# Patient Record
Sex: FEMALE | Race: WHITE | Employment: FULL TIME | URBAN - METROPOLITAN AREA
[De-identification: names, ages, dates, MRNs, and addresses within clinical notes are randomized per-mention and may not be internally consistent; named-entity substitution may affect disease eponyms.]

---

## 2018-11-23 ENCOUNTER — TELEPHONE (OUTPATIENT)
Dept: TRANSPLANT | Facility: CLINIC | Age: 63
End: 2018-11-23

## 2018-11-23 NOTE — TELEPHONE ENCOUNTER
"[facility] BREEZE  [id number]      LIVING KIDNEY DONOR EVALUATION  Donor First Name Jacque Donor MRN    Donor Last Name Hamilton Completed 2018 12:47 PM    1955 Record ID [id number]   BREEZE Screen PASSED     Intended Recipient  Recipient First Name Chavez Recipient MRN    Recipient Last Name Aamir Relationship Co-worker   Recipient   Recipient Diagnosis    Recipient's ABO      Donor Information  Age 63 Gender Female   Ht 165 cm (5' 5'') Race    Wt 65.7 kg (145 lbs) Ethnicity Not /   BMI 24.10 kg/m  Preferred Language English      Required No     Blood Type A   Demographics  Home Address [address] [place] [address] # [phone]   [facility] Type [facility] Alternate # [phone]   Zip Code [address] Type Work - Private   Country [place] Preferred Contact day Mon, Fri, Thur, Wed, Tue   Email [email or web address] Preferred Contact time 11:00 AM-1:00 PM, 09:00 AM-11:00 AM   &&   Donor's Medical Information  Medical History Congenital Heart Defect NOS   Fibromyalgia   Glaucoma   Insomnia   Joint Pain   Joint Pain/Arthritis NOS   Migraine Headaches   Osteoporosis   Stress Tests, Normal  Medications Alendronate   Ambien   Amitriptyline   Calcium Citrate with Vitamin D3   Combigan Eye Drops   Imitrex Tablets   Latanoprost Eye Drops   Methocarbamol   rhopressa   Surgical History Bunionectomy, Right   Hysterectomy   Total Knee Arthroplasty, Left   Total Knee Arthroplasty, Right   Tubal Ligation   Uterine and/or Ovarian Surgery, NOS Allergies Morphine : Nausea and/or Vomiting   Social History EtOH: Rare (1-2 drinks/month)   Illicit Drug Use: Denies   Tobacco: Denies Self-Reported Functional Status \"I am able to climb 2 flights of stairs without stopping\"   Family Medical History Cancer (denies)   Diabetes (denies)   Heart Disease (denies)   Hypertension (denies)   Kidney Disease (denies)   Kidney Stones (denies) Exercise Frequency Exercise (2 " X per week)   Review of Organ Systems  Review of Systems Airway or Lungs: No   Blood Disorder: No   Cancer: No   Diabetes,Thyroid,Adrenal,Endocrine Disorder: No   Digestive or Liver: No   Female Health: No   Heart or Circulatory System: Yes   Immune Diseases: No   Kidneys and Bladder: No   Muscles,Bones,Joints: Yes   Neuro: No   Psych: No   &&   Donor's Social Information  Marital Status  Living Accommodation Owns own home/apartment   Level of Education High school or secondary school degree complete Living Arrangement With relatives other than spouse, parents   Employment Status Full Time Concerns: health and life insurance Yes   Employer [employer] Concerns: job security and lost income No   Occupation      Medical Insurance Status Has medical insurance     High Risk Behavior  High Risk Behaviors Blood transfusion < 12 months. (NO)   Commercial sex < 12 months. (NO)   Illicit IV drug use < 5yrs. (NO)   Other high risk sexual contact < 12 months. (NO)   Reason for Donation  Referral Self Researched Reason for Donation I have a close friend and colleague that has been suffering with Diabetes and is now in dialysis. Want to help   Permission to Disclose Inquiry Yes Patient Comments    Donor Motivation Level Highly motivated donor     PCP Contact  [facility]   PCP Phone [phone]   Emergency Contact  First Name Traci First Name Wes   Last Name Hamilton Last Name Hamilton   Phone # [phone] Phone # [phone]   Phone Type Mobile Phone Type Mobile   Relationship Child Relationship Child   Office Use  Reviewed By    Reviewed 11/21/2018 9:23 AM   Admin Folder Accept   Comments 11/21 - Passed Eval   Lost for Followup    Extended Comments    BREEZE ID [email or web address]:[id number] survey status completed   Activity History  Call  Task    Due Date 11/21/2018   Last Modified Date/Time 11/21/2018 1:49 PM   Comments

## 2018-11-26 DIAGNOSIS — Z00.5 TRANSPLANT DONOR EVALUATION: Primary | ICD-10-CM

## 2018-11-26 NOTE — TELEPHONE ENCOUNTER
Is abo incompat. Interested in PEP. Hx Fibromyalgia--states she occ has mild flare ups--about 1-2x's a yr.Discussed her use of Methocarbamol which she states she uses occ. Will send Phase 1 orders/pkt.

## 2018-12-14 ENCOUNTER — DOCUMENTATION ONLY (OUTPATIENT)
Dept: TRANSPLANT | Facility: CLINIC | Age: 63
End: 2018-12-14

## 2019-01-03 DIAGNOSIS — Z00.5 TRANSPLANT DONOR EVALUATION: Primary | ICD-10-CM

## 2019-01-07 ENCOUNTER — DOCUMENTATION ONLY (OUTPATIENT)
Dept: TRANSPLANT | Facility: CLINIC | Age: 64
End: 2019-01-07

## 2019-01-15 ENCOUNTER — TELEPHONE (OUTPATIENT)
Dept: TRANSPLANT | Facility: CLINIC | Age: 64
End: 2019-01-15

## 2019-01-15 DIAGNOSIS — Z00.5 TRANSPLANT DONOR EVALUATION: Primary | ICD-10-CM

## 2019-01-15 NOTE — TELEPHONE ENCOUNTER
Informed Vanessa that her CrCl was OK'd by Dr Fuentes. FBS was 103 on Phase 1's--will get that repeated with an A1C.

## 2019-01-21 ENCOUNTER — TELEPHONE (OUTPATIENT)
Dept: TRANSPLANT | Facility: CLINIC | Age: 64
End: 2019-01-21

## 2019-01-21 NOTE — TELEPHONE ENCOUNTER
Informed Melisa that her FBS/A1C are normal. Wants to now come for eval. [place] Has not left [place] in past 3 months.

## 2019-01-22 ENCOUNTER — TELEPHONE (OUTPATIENT)
Dept: TRANSPLANT | Facility: CLINIC | Age: 64
End: 2019-01-22

## 2019-01-23 DIAGNOSIS — Z00.5 TRANSPLANT DONOR EVALUATION: ICD-10-CM

## 2019-01-23 NOTE — TELEPHONE ENCOUNTER
Sent a email letting Jacque know about the eval and how it works. Asked her to email me some dates that would work for her

## 2019-01-25 ENCOUNTER — DOCUMENTATION ONLY (OUTPATIENT)
Dept: CARE COORDINATION | Facility: CLINIC | Age: 64
End: 2019-01-25

## 2019-02-01 ENCOUNTER — TELEPHONE (OUTPATIENT)
Dept: TRANSPLANT | Facility: CLINIC | Age: 64
End: 2019-02-01

## 2019-02-01 NOTE — TELEPHONE ENCOUNTER
I received a message from the patient that she wanted to ask me about the CT procedure as she tends to get a little claustraphobic.  I spoke to the CT Tech to get the following description:  The procedure would have her go into the imaging machine up to her shoulder/arm pit area.  During the 8 minutes test there is a break where they have to wait for a delay.  She said that the patients head would be outside of the machine and there is a painting on the ceiling or a washcloth could be applied to their eyes if they prefer for relaxation.  There is sedation that can be done, but she would need a .    I have left a voicemail message with the patient with this information and gave her my phone number to call me back if she should have further concerns.

## 2019-02-08 ENCOUNTER — INFUSION THERAPY VISIT (OUTPATIENT)
Dept: INFUSION THERAPY | Facility: CLINIC | Age: 64
End: 2019-02-08
Attending: INTERNAL MEDICINE

## 2019-02-08 ENCOUNTER — ALLIED HEALTH/NURSE VISIT (OUTPATIENT)
Dept: TRANSPLANT | Facility: CLINIC | Age: 64
End: 2019-02-08
Attending: TRANSPLANT SURGERY

## 2019-02-08 ENCOUNTER — OFFICE VISIT (OUTPATIENT)
Dept: NEPHROLOGY | Facility: CLINIC | Age: 64
End: 2019-02-08
Attending: TRANSPLANT SURGERY
Payer: COMMERCIAL

## 2019-02-08 ENCOUNTER — ANCILLARY PROCEDURE (OUTPATIENT)
Dept: GENERAL RADIOLOGY | Facility: CLINIC | Age: 64
End: 2019-02-08
Attending: INTERNAL MEDICINE
Payer: COMMERCIAL

## 2019-02-08 ENCOUNTER — OFFICE VISIT (OUTPATIENT)
Dept: TRANSPLANT | Facility: CLINIC | Age: 64
End: 2019-02-08
Attending: TRANSPLANT SURGERY

## 2019-02-08 ENCOUNTER — ANCILLARY PROCEDURE (OUTPATIENT)
Dept: CT IMAGING | Facility: CLINIC | Age: 64
End: 2019-02-08
Attending: INTERNAL MEDICINE

## 2019-02-08 VITALS
OXYGEN SATURATION: 98 % | HEART RATE: 88 BPM | SYSTOLIC BLOOD PRESSURE: 120 MMHG | WEIGHT: 146.4 LBS | BODY MASS INDEX: 24.39 KG/M2 | DIASTOLIC BLOOD PRESSURE: 79 MMHG | HEIGHT: 65 IN

## 2019-02-08 DIAGNOSIS — Z00.5 EXAMINATION OF POTENTIAL DONOR OF ORGAN AND TISSUE: Primary | ICD-10-CM

## 2019-02-08 DIAGNOSIS — G47.00 INSOMNIA, UNSPECIFIED TYPE: ICD-10-CM

## 2019-02-08 DIAGNOSIS — Z00.5 TRANSPLANT DONOR EVALUATION: ICD-10-CM

## 2019-02-08 DIAGNOSIS — M81.0 OSTEOPOROSIS, UNSPECIFIED OSTEOPOROSIS TYPE, UNSPECIFIED PATHOLOGICAL FRACTURE PRESENCE: ICD-10-CM

## 2019-02-08 DIAGNOSIS — Z00.5 TRANSPLANT DONOR EVALUATION: Primary | ICD-10-CM

## 2019-02-08 DIAGNOSIS — M79.7 FIBROMYALGIA: ICD-10-CM

## 2019-02-08 DIAGNOSIS — Z52.4 KIDNEY DONOR: ICD-10-CM

## 2019-02-08 LAB
ABO + RH BLD: NORMAL
ALBUMIN SERPL-MCNC: 3.8 G/DL (ref 3.4–5)
ALBUMIN UR-MCNC: NEGATIVE MG/DL
ALP SERPL-CCNC: 49 U/L (ref 40–150)
ALT SERPL W P-5'-P-CCNC: 20 U/L (ref 0–50)
ANION GAP SERPL CALCULATED.3IONS-SCNC: 6 MMOL/L (ref 3–14)
APPEARANCE UR: CLEAR
APTT PPP: 26 SEC (ref 22–37)
AST SERPL W P-5'-P-CCNC: 16 U/L (ref 0–45)
BILIRUB SERPL-MCNC: 0.5 MG/DL (ref 0.2–1.3)
BILIRUB UR QL STRIP: NEGATIVE
BLD GP AB SCN SERPL QL: NORMAL
BLOOD BANK CMNT PATIENT-IMP: NORMAL
BUN SERPL-MCNC: 16 MG/DL (ref 7–30)
CALCIUM SERPL-MCNC: 8.2 MG/DL (ref 8.5–10.1)
CHLORIDE SERPL-SCNC: 108 MMOL/L (ref 94–109)
CHOLEST SERPL-MCNC: 184 MG/DL
CMV IGG SERPL QL IA: <0.2 AI (ref 0–0.8)
CO2 SERPL-SCNC: 28 MMOL/L (ref 20–32)
COLOR UR AUTO: YELLOW
CREAT SERPL-MCNC: 0.86 MG/DL (ref 0.52–1.04)
CREAT UR-MCNC: 113 MG/DL
EBV VCA IGG SER QL IA: >8 AI (ref 0–0.8)
EBV VCA IGM SER QL IA: <0.2 AI (ref 0–0.8)
ERYTHROCYTE [DISTWIDTH] IN BLOOD BY AUTOMATED COUNT: 12.2 % (ref 10–15)
GFR SERPL CREATININE-BSD FRML MDRD: 71 ML/MIN/{1.73_M2}
GLUCOSE SERPL-MCNC: 94 MG/DL (ref 70–99)
GLUCOSE UR STRIP-MCNC: NEGATIVE MG/DL
HBA1C MFR BLD: 5.2 % (ref 0–5.6)
HBV CORE AB SERPL QL IA: NONREACTIVE
HBV SURFACE AB SERPL IA-ACNC: 0.42 M[IU]/ML
HBV SURFACE AG SERPL QL IA: NONREACTIVE
HCT VFR BLD AUTO: 42 % (ref 35–47)
HCV AB SERPL QL IA: NONREACTIVE
HDLC SERPL-MCNC: 57 MG/DL
HGB BLD-MCNC: 13.5 G/DL (ref 11.7–15.7)
HGB UR QL STRIP: ABNORMAL
HIV 1+2 AB+HIV1 P24 AG SERPL QL IA: NONREACTIVE
INR PPP: 1.03 (ref 0.86–1.14)
KETONES UR STRIP-MCNC: NEGATIVE MG/DL
LDLC SERPL CALC-MCNC: 114 MG/DL
LEUKOCYTE ESTERASE UR QL STRIP: ABNORMAL
MCH RBC QN AUTO: 28.1 PG (ref 26.5–33)
MCHC RBC AUTO-ENTMCNC: 32.1 G/DL (ref 31.5–36.5)
MCV RBC AUTO: 87 FL (ref 78–100)
MICROALBUMIN UR-MCNC: 6 MG/L
MICROALBUMIN/CREAT UR: 5.64 MG/G CR (ref 0–25)
MUCOUS THREADS #/AREA URNS LPF: PRESENT /LPF
NITRATE UR QL: NEGATIVE
NONHDLC SERPL-MCNC: 127 MG/DL
PH UR STRIP: 6 PH (ref 5–7)
PHOSPHATE SERPL-MCNC: 2.8 MG/DL (ref 2.5–4.5)
PLATELET # BLD AUTO: 177 10E9/L (ref 150–450)
POTASSIUM SERPL-SCNC: 3.6 MMOL/L (ref 3.4–5.3)
PROT SERPL-MCNC: 6.8 G/DL (ref 6.8–8.8)
PROT UR-MCNC: 0.08 G/L
PROT/CREAT 24H UR: 0.07 G/G CR (ref 0–0.2)
RBC # BLD AUTO: 4.81 10E12/L (ref 3.8–5.2)
RBC #/AREA URNS AUTO: 2 /HPF (ref 0–2)
SODIUM SERPL-SCNC: 142 MMOL/L (ref 133–144)
SOURCE: ABNORMAL
SP GR UR STRIP: 1.02 (ref 1–1.03)
SPECIMEN EXP DATE BLD: NORMAL
SPECIMEN EXP DATE BLD: NORMAL
SQUAMOUS #/AREA URNS AUTO: 1 /HPF (ref 0–1)
T PALLIDUM AB SER QL: NONREACTIVE
TRIGL SERPL-MCNC: 65 MG/DL
URATE SERPL-MCNC: 3.7 MG/DL (ref 2.6–6)
UROBILINOGEN UR STRIP-MCNC: 0 MG/DL (ref 0–2)
WBC # BLD AUTO: 4.4 10E9/L (ref 4–11)
WBC #/AREA URNS AUTO: 3 /HPF (ref 0–5)

## 2019-02-08 PROCEDURE — 86803 HEPATITIS C AB TEST: CPT | Performed by: INTERNAL MEDICINE

## 2019-02-08 PROCEDURE — 84100 ASSAY OF PHOSPHORUS: CPT | Performed by: INTERNAL MEDICINE

## 2019-02-08 PROCEDURE — 86665 EPSTEIN-BARR CAPSID VCA: CPT | Performed by: INTERNAL MEDICINE

## 2019-02-08 PROCEDURE — 81001 URINALYSIS AUTO W/SCOPE: CPT | Performed by: INTERNAL MEDICINE

## 2019-02-08 PROCEDURE — 85610 PROTHROMBIN TIME: CPT | Performed by: INTERNAL MEDICINE

## 2019-02-08 PROCEDURE — 25000128 H RX IP 250 OP 636: Mod: JW,ZF | Performed by: INTERNAL MEDICINE

## 2019-02-08 PROCEDURE — 85730 THROMBOPLASTIN TIME PARTIAL: CPT | Performed by: INTERNAL MEDICINE

## 2019-02-08 PROCEDURE — 40000866 ZZHCL STATISTIC HIV 1/2 ANTIGEN/ANTIBODY PRETRANSPLANT ONLY: Performed by: INTERNAL MEDICINE

## 2019-02-08 PROCEDURE — 36415 COLL VENOUS BLD VENIPUNCTURE: CPT | Performed by: INTERNAL MEDICINE

## 2019-02-08 PROCEDURE — 86850 RBC ANTIBODY SCREEN: CPT | Performed by: INTERNAL MEDICINE

## 2019-02-08 PROCEDURE — 86900 BLOOD TYPING SEROLOGIC ABO: CPT | Performed by: INTERNAL MEDICINE

## 2019-02-08 PROCEDURE — 86480 TB TEST CELL IMMUN MEASURE: CPT | Performed by: INTERNAL MEDICINE

## 2019-02-08 PROCEDURE — 82542 COL CHROMOTOGRAPHY QUAL/QUAN: CPT | Performed by: INTERNAL MEDICINE

## 2019-02-08 PROCEDURE — 86706 HEP B SURFACE ANTIBODY: CPT | Performed by: INTERNAL MEDICINE

## 2019-02-08 PROCEDURE — 82043 UR ALBUMIN QUANTITATIVE: CPT | Performed by: INTERNAL MEDICINE

## 2019-02-08 PROCEDURE — 85027 COMPLETE CBC AUTOMATED: CPT | Performed by: INTERNAL MEDICINE

## 2019-02-08 PROCEDURE — 86905 BLOOD TYPING RBC ANTIGENS: CPT | Performed by: INTERNAL MEDICINE

## 2019-02-08 PROCEDURE — 87340 HEPATITIS B SURFACE AG IA: CPT | Performed by: INTERNAL MEDICINE

## 2019-02-08 PROCEDURE — 83036 HEMOGLOBIN GLYCOSYLATED A1C: CPT | Performed by: INTERNAL MEDICINE

## 2019-02-08 PROCEDURE — G0463 HOSPITAL OUTPT CLINIC VISIT: HCPCS | Mod: ZF

## 2019-02-08 PROCEDURE — 84156 ASSAY OF PROTEIN URINE: CPT | Performed by: INTERNAL MEDICINE

## 2019-02-08 PROCEDURE — 86644 CMV ANTIBODY: CPT | Performed by: INTERNAL MEDICINE

## 2019-02-08 PROCEDURE — 80061 LIPID PANEL: CPT | Performed by: INTERNAL MEDICINE

## 2019-02-08 PROCEDURE — 86704 HEP B CORE ANTIBODY TOTAL: CPT | Performed by: INTERNAL MEDICINE

## 2019-02-08 PROCEDURE — 86901 BLOOD TYPING SEROLOGIC RH(D): CPT | Performed by: INTERNAL MEDICINE

## 2019-02-08 PROCEDURE — 80053 COMPREHEN METABOLIC PANEL: CPT | Performed by: INTERNAL MEDICINE

## 2019-02-08 PROCEDURE — 0064U ANTB TP TOTAL&RPR IA QUAL: CPT | Performed by: INTERNAL MEDICINE

## 2019-02-08 PROCEDURE — 84550 ASSAY OF BLOOD/URIC ACID: CPT | Performed by: INTERNAL MEDICINE

## 2019-02-08 RX ORDER — AMOXICILLIN 500 MG/1
CAPSULE ORAL
COMMUNITY
Start: 2018-12-31 | End: 2019-09-04

## 2019-02-08 RX ORDER — IOPAMIDOL 755 MG/ML
100 INJECTION, SOLUTION INTRAVASCULAR ONCE
Status: COMPLETED | OUTPATIENT
Start: 2019-02-08 | End: 2019-02-08

## 2019-02-08 RX ORDER — METHOCARBAMOL 500 MG/1
TABLET, FILM COATED ORAL
Status: ON HOLD | COMMUNITY
Start: 2018-01-01 | End: 2019-07-26

## 2019-02-08 RX ORDER — ASPIRIN 325 MG
81 TABLET ORAL DAILY
COMMUNITY
End: 2019-07-22

## 2019-02-08 RX ADMIN — IOPAMIDOL 100 ML: 755 INJECTION, SOLUTION INTRAVASCULAR at 13:31

## 2019-02-08 RX ADMIN — IOHEXOL 5 ML: 300 INJECTION, SOLUTION INTRAVENOUS at 07:51

## 2019-02-08 ASSESSMENT — PAIN SCALES - GENERAL: PAINLEVEL: NO PAIN (0)

## 2019-02-08 ASSESSMENT — MIFFLIN-ST. JEOR: SCORE: 1214.95

## 2019-02-08 NOTE — NURSING NOTE
"Chief Complaint   Patient presents with     Transplant Donor Evaluation     Pep donor     Blood pressure 120/79, pulse 88, height 1.651 m (5' 5\"), weight 66.4 kg (146 lb 6.4 oz), SpO2 98 %.    NAUN MADRIGAL CMA    "

## 2019-02-08 NOTE — NURSING NOTE
I Saw Jacque LIDIA Villasenor in clinic during kidney donor evaluation.  Has offered to be donor to Paired exchange program for co-worker Chavez Rutledge.    They are incompatible by ABO incompatible.  The recipient Anti A titers are > 256.      Discussed surgery hospitalization and recovery.    Knows when and how to obtain evaluation results and the process for scheduling surgery.  Has received and reviewed the donor education materials including donor DVD.  Signed consent for donor evaluation.  Reviewed SRTR Data sheet.  Donor was Provided Living Donor Collective (LDC) Materials? Yes  Donor has agreed to be contacted by SRTR for follow-up LDC Questions? Yes  Reviewed that our center is participating in the Living Donor Collective to study the long-term outcomes of living organ donation.  Patient was born in the [place]    Requested routine cancer screening tests:     1.Pap- She has had a hysterectomy has no cervix.     2.  Mammo- in Epic    3. Colonoscopy- in Epic.  Time spent 20 minutes.    I reviewed details pertaining to the Paired Exchange programs.       1. National Kidney Registry    2. [facility] KPD Program    4. Internal Exchange at the [facility]    Matching Procedure and Logistics    Reviewed that donors and candidates do not choose their match and that they may decline a match. Explained examples of potential chain/swap scenarios and demonstrated how more than one candidate can receive a transplant in these exchanges.   Reviewed that the KPD waiting time is unknown, the intended recipient's antibody panel and reviewed their ABO match power.   Frequent lab draws are necessary in the KPD programs. NKR will send a kit when we initiate the listing to store the blood by freezing the sample and use it for exploratory crossmatches. Once a match offer is made, a fresh blood sample will be needed for the final confirmatory compatibility testing, as well as infectious disease testing.  I reviewed billing policy for  the donor evaluation and for the KPD program.  Bridging  KPD programs may need to have the donor wait a period of time after the recipient receives their kidney, in order to determine matching and logistics for the next cluster of a chain. If donor consents to be a bridge donor, the time waiting for the swap cluster to take place is unknown. The donor stated that they are okay being a bridge donor if needed. Further blood work may be needed if they are waiting to bridge one year after the initial evaluation.  Unexpected Outcome  I discussed possibility of an unexpected event on the day of transplant. I explained how their donated kidney would be backed up here locally as well as in the destination city in the event that the matched recipient is unable to receive the donated kidney.   I reviewed the KPD programs remedy for failed KPD exchanges, and the remedy does not include any additional priority for the paired candidate on the  donor waiting list. The outcome of the matched recipient may be different from the intended recipient's outcome.  Shipping  The kidney's are transported to the matched recipient's in the exchanges via an approved  service to the airport and then flown fixed wing to the intended destination.GPS devices are used in all the NKR exchanges for close monitoring. Risk included in shipping include damage or loss related to unforseen situations; car crash, airplane crash, terrorist events, etc.   Communication  Donors can communicate with the recipient by giving all correspondence to the Transplant Coordinator, omitting all personal identifiers. The Transplant Coordinator will review and deliver to the recipients Coordinator.  Rights  Donors have a right to withdraw from participation in the KPD program at any time.     Donor has signed consent for:  *National Kidney Registry  *United Network for Organ Sharing, KPD  *[facility] Internal Exchange  *Bridge Donation  *Shipping Risks  *Release  of Information form used for sharing evaluation clinical data to recipient transplant center.     Questions answered.      Living Kidney Donor Consent per OPTN Policy for Transplant Coordinators     Written assurance has been obtained from the patient that the donor:   1) Is willing to donate  2) Is free from inducement and coercion  3) Has been informed that the donor may decline to donate at any time  4) Has been informed that transplant centers must:   A) Offer donors an opportunity to discontinue the donor consent or evaluation process in a way that is protected and confidential  B) Provide an independent donor advocate (PRIYANKA) to assist the potential donor during this process     The following was presented in a language in which donor is able to engage in meaningful dialogue and was reviewed and discussed with the patient by the transplant coordinator and the physician.      The following has been provided:   Education and instruction about all phases of the living donation process includin) Consent  2) The donor must undergo medical and psychosocial evaluations  3) Disclosure that the recovery hospital will take all reasonable precautions to provide confidentiality for the donor/recipient  4) Disclosure that it is a federal crime for any person to knowingly acquire, obtain or otherwise transfer any human organ for valuable consideration  5) The transplant hospital may refuse the potential donor, and the donor could be evaluated by another transplant program with different selection criteria  6) Data from the most recent SRTR center-specific reports:   A) National 1-year patient and graft survival rates  B) Hospital s 1-year patient and graft survival rates  C) Notification about all CMS outcome requirements not being met by the recovery and recipient s hospitals     The following has been provided:   1) Education about expected post-donation kidney function and how chronic kidney disease and end-stage  renal disease might potentially impact the donor in the future to include:  A) On average, donors will have 25-35% permanent loss of kidney function at donation  B) Baseline risk of End Stage Renal Disease (ESRD) does not exceed that of members of general population with same demographic profile  C) Donor risks must be interpreted in light of known epidemiology of both Chronic Kidney Disease (CKD) or ESRD  D) CKD generally develops in midlife (40-50 years old)  E) ESRD generally develops after age 60  F) Medical evaluation of young potential donor cannot predict lifetime risk  2) Donors may be at higher risk for CKD if they sustain damage to the remaining kidney. 3) Development of CKD and progression to ESRD may be more rapid with only 1 kidney  4) Dialysis is required when reaching ESRD  5) Current practice prioritizes prior living kidney donors who became kidney transplant candidates  6) Alternate procedures or courses of treatment for the recipient, including  donor transplant  A) A  donor kidney may become available for the recipient before donor evaluation is complete or transplant occurs  B) Any transplant candidate may have risk factors for increased morbidity or mortality that are not disclosed to the potential donor  7) The patient will receive a thorough medical and psychosocial evaluation  8) Health information obtained during the evaluation is subject to the same regulations as all records and could reveal conditions that must be reported to local, state, or federal public health authorities  9) The [facility] [place], is required to report living donor follow up information at 6, 12, and 24 months  10) Potential donors must commit to post operative follow up testing coordinated by the [facility]  11) Any infectious disease or malignancy pertinent to acute recipient care discovered during the potential donor s first two years of follow up care:  A) Will be disclosed  to the donor  B) May need to be reported to local, state or federal public health authorities  C) Will be disclosed to their recipient s transplant center, and  D) Will be reported through the OPTN Improving Patient Safety Portal     Disclosure has been provided that these risks may be transient or permanent & include but are not limited to potential medical or surgical risks:  Death  Scars, pain, fatigue, and other consequences typical of any surgical procedure  Decreased kidney function  Abdominal or bowel symptoms such as bloating and nausea, and developing bowel obstruction  Kidney failure and the need for dialysis or kidney transplant for the donor  Impact of obesity, hypertension or other donor-specific medical conditions on morbidity and mortality of the potential donor.  Ivone Higuera RN  Living Donor Coordinator  02/08/2019 2:03 PM

## 2019-02-08 NOTE — PROGRESS NOTES
Outpatient MNT: Kidney Donor Evaluation    Current BMI: 24.3 (HT 65 in,  lbs/66 kg)  BMI is within criteria of <30 for kidney donation    8 Year Risk of Diabetes  </= 3%     Time Spent: 15 minutes  Visit Type: Initial  Referring Physician: Blaine  Pt accompanied by: self    Nutrition Assessment  Vitamins, Supplements, Pertinent Meds: calcium + vit D  Herbal Medicines/Supplements: none    Diet Recall  Breakfast Yogurt or oats   Lunch Salad, tuna s/w    Dinner Microwave meals 2x/week (when working her second job) or salmon at home    Snacks Granola bar, fruit    Beverages Water, decaf coffee 1x/week, occasional soy milk    Alcohol 3 drinks/month    Dining out 2x/month      Physical Activity  Just starting to go back to the gym; took some time off from exercise as she was taking care of her elderly parents     Anthropometrics  Height:   65 in   BMI:    24.3    Weight Status:Normal BMI   Weight:  146 lbs            IBW (lb): 125  % IBW: 117    Wt Hx: Up a few lbs, but nothing major    Adj/dosing BW: 146 lbs/66 kg       Labs  Recent Labs   Lab Test 02/08/19  0720   CHOL 184   HDL 57   *   TRIG 65       FBG = 94  A1C = 5.2  BP = wnl    Prediction of Incident Diabetes Mellitus in Middle-aged Adults: The [place] Offspring Study  Clinton Patel MD; James B. Meigs, MD, MPH; Vielka Angel, PhD; Marcella Cuevas MD, MPH; Wes William MD; Justo Sharma Sr,   PhD  Pt's estimated risk for T2DM (per Table 6 above)  Pt received points for the following criteria: none  Total points: 0  8-Year risk of T2DM: </= 3%    Estimated Nutrition Needs  Energy  [id number]    (25-30 kcal/kg for maintenance)     Protein  53-66    (0.8-1 g/kg for maintenance)           Fluid  1 ml/kcal or per MD     Nutrition Diagnosis  Food and nutrition related knowledge deficit r/t pre transplant donor eval pt AEB pt verbalized not hearing pre/post transplant diet guidelines.    Nutrition Intervention  Nutrition education  provided:  Reviewed overall healthy diet guidelines for pre and post kidney donation. Discussed maintenance of a healthy weight and Na+ intake <3000 mg/day (<2000 mg/day if HTN).    Avoid the following post op d/t unknown effects on the organs:  - Herbal, Chinese, holistic, chiropractic, natural, alternative medicines and supplements  - Detoxes and cleanses  - Weight loss pills  - Protein powders or other products with extracts or herbs (ie green tea extract)    Patient Understanding: Pt verbalized understanding of education provided.  Expected Compliance: Good  Follow-Up Plans: PRN     Nutrition Goals  Pt to verbalize understanding of education provided     Provided pt with contact info.   Michelle Loja RD, LD  [facility] [phone]

## 2019-02-08 NOTE — LETTER
2/8/2019       RE: Jacque Villasenor  [address] [place] [facility] [address]     Dear Colleague,    Thank you for referring your patient, Jacque Villasenor, to the [facility] SOLID ORGAN TRANSPLANT at [facility]. Please see a copy of my visit note below.    RE: Jacque Villasenor  [facility] #[id number]           I saw your patient, Jacque Villasenor, in consultation in our pretransplant clinic. She was here at clinic for evaluation as a possible kidney donor to a friend.  She had seen our donor video.  Our donor coordinator shared our center-specific results with her.  We discussed:    (1) The risks of donation--including mortality (0.03% risk) and morbidity (approximately 1% risk of major morbidity).  We discussed the major reported causes of death after kidney donation (bleeding, infection, pulmonary embolism).  We discussed the potential complications, including:  bleeding requiring reoperation, infection requiring reoperation, pneumonia, bowel obstruction, infection at the site of the incision, hernia at the site of the incision, deep vein thrombosis, deep vein thrombosis with associated pulmonary embolism, and urinary tract infection.  I told her I could not possibly name all of the risks, but that she was at risk for any complications that could occur in any operation (and that a local library would have books/resources that could provide more detail).       (2) We also discussed the long-term risks of living with one kidney.  We talked in detail about the new publications suggesting slightly increased long-term risk of ESRD after donor nephrectomy.  We discussed the fact that most of the ESRD that has developed after donation has occurred in those donating to a relative; and that it is known that individuals who have a relative with ESRD are at increased risk of ESRD.     (3) The hospital stay and the fact that if all goes well, discharge would occur within two to three days  after donor nephrectomy.  We also discussed the fact that there would be no diet or fluid restrictions (again if all went well), and that the only new medication that she would be on would be pain medication.  We discussed the fact that most patients are on Tylenol or something similar within five to six days of surgery.      (4) The recovery time after surgery and the restrictions that are necessary:  a) no driving for a couple of weeks (or until she felt that her reaction would be adequate if she had to slam on the brakes; and b) no lifting over 10 pounds or any exercise that would stretch her abdominal muscles for six weeks (to allow the muscles to heal so that she doesn't develop a hernia).  We also discussed return to work, which could occur in approximately three to four weeks if she had a desk job or would require not returning to work for at least six weeks if the job required any heavy lifting or exercise.  We discussed the potential for not getting her pep and energy back for anywhere from two to six weeks after surgery and how there was a tremendous individual variation in return of full energy level.  I discussed our donor follow-up studies; and that in our surveys, 90% of donors had their energy back by 6 weeks postdonation.    (5) The concern about long distance travel for the first couple of weeks post-donation.  We discussed the risks of deep vein thrombosis associated with plane or car travel.  I recommended that, if flying, she should get up and walk around every 30-40 minutes; if driving she should ask the  to stop the car every 30-45 minutes so Ms. Villasenor could take a short walk.    (6) The fact that the recipient could be on a waiting list for  donor transplant and would ultimately receive a kidney if Ms. Villasenor did not donate.      I attempted to answer any remaining questions.  I also told her that should she have any questions, she should feel free to contact us.  We would  be glad to answer any questions either over the phone or at another clinic visit.  Her transplant coordinator is Ivone Higuera and may be reached at [phone].  Thank you for the opportunity to see her.    I spent 30 minutes with this patient.  Over 95% that time was spent in counseling and coordination of care.      Again, thank you for allowing me to participate in the care of your patient.      Sincerely,    Sudarshan Valladares MD

## 2019-02-08 NOTE — PROGRESS NOTES
Assessment and Plan:  1. Prospective kidney transplant donor with no issues that need to be addressed prior to donation. Patient's blood pressure is acceptable at this visit, kidney function appears to be possibly low with Iohexol pending, and urinalysis is bland.    GFR estimation: await Iohexol for direct measurement.      Hx of osteoporosis: on fosamax right now. This would have to be watched if GFR approached 30 ml / min post donation.     Hx of fibromyalgia: well controlled without NSAIDs.    Migraines: does not use NSAIDs for this.     Joint pain: uses intermittent methocarbamol for this.     Insomnia: PRN ambien use.     Overall the patient has no medical contraindication to being a live kidney donor.  We will discuss her candidacy at our multidisciplinary selection committee next week.    Discussed the risks of donating a kidney, including the surgical risk and the possible risks of living with one kidney.    Education about expected post-donation kidney function and how chronic kidney disease (CKD) and end stage kidney disease (ESKD) might potentially impact the donor in the future, include, but not limited to:       - On average, donors will have 25-35% permanent loss of kidney function at donation.       - Baseline risk of ESKD may slightly exceed that of members of the general population with the same demographic profile.       - Donor risks must be interpreted in light of known epidemiology of both CKD or ESKD, such as that CKD generally develops in midlife (40-50 years old) and ESKD generally develops after age 60.       - Medical evaluation of young potential donors cannot predict lifetime risk of CKD or ESKD.       - Donors may be at higher risk for CKD if they sustain damage to the remaining kidney.       - Development of CKD and progression of ESKD may be more rapid with only 1 kidney.       - Some type of kidney replacement therapy, either kidney transplant or dialysis, is required when reaching  ESKD.    Potential medical or surgical risks include, but not limited to:       - Death.       - Scars, pain, fatigue, and other consequences typical of any surgical procedure.       - Decreased kidney function.       - Abdominal or bowel symptoms, such as bloating and nausea, and developing bowel obstruction.       - Kidney failure (ESKD) and the need for a kidney transplant or dialysis for the donor.       - Impact of obesity, hypertension, or other donor-specific medical conditions on morbidity and mortality of the potential donor.    Patients overall evaluation will be discussed with the transplant team and a final recommendation on the patients' suitability to be a kidney transplant donor will be made at that time.    Consult:  Jacque Villasenor was seen in consultation at the request of Dr. Sudarshan Valladares for evaluation as a potential kidney transplant donor.    Reason for Visit:  Jacque Villasenor is a 64 year old female who presents for a kidney donor evaluation.  Patient would like to donate to a co-worker.    HPI:    Patient has a history of osteoporosis and osteoarthritis.     Glaucoma and cataracts in her eyes.     Currently on:    Amitryptiline: on this for arthritis; fibromyalgia  Methocarbamol:  Ambien: PRN for insomnia    Osteoporosis:     Fosamax - on this for 1 year.     Surgeries: knee arthroplasty x 2    Tubal, hysterectomy, bunion    No known family hx of kidney disease.     Today, she feels pretty good.     No ha, no vision changes, no dysphagia, no cp, no sob. No abdominal pain. No dysuria, no hematuria, no rash, achy, but nothing out of the ordinary. No numbness, weakness or tingling. No f/s/c, weight increase of 6 lbs over last 6 months.            Kidney Disease Hx:       h/o Kidney Problems: No  Family h/o Genetic Kidney Disease: No       h/o HTN: No    Usual BP: Not checked       h/o Protein in Urine: No  h/o Blood in Urine: No       h/o Kidney Stones: No  h/o Kidney Injury: No       h/o  Recurrent UTI: No  h/o Genitourinary Problems: No       h/o Chronic NSAID Use: No         Other Medical Hx:       h/o DM: No   h/o Gestational DM: No       h/o Preeclampsia: No          h/o Gastrointestinal, Pancreas or Liver Problems: No       h/o Lung or Heart Problems: No       h/o Hematologic Problems: No  h/o Bleeding or Clotting Problems: No       h/o Cancer: No       h/o Infection Problems: No         Skin Cancer Risk:       h/o more than 50 moles: No       h/o extensive sun exposure: Yes        h/o melanoma: No       Family h/o melanoma: Yes: mother - diagnosed at age 60         Mental Health Assessment:       h/o Depression: No       h/o Psychiatric Illness: No       h/o Suicidal Attempt(s): No    ROS:   A comprehensive review of systems was obtained and negative, except as noted in the HPI or PMH.    PMH:   History was taken from the patient as noted below.  Past Medical History:   Diagnosis Date     Cataract      Fibromyalgia      Glaucoma      Insomnia      Insomnia      Osteoarthritis      Osteoporosis      Osteoporosis        PSH:   Past Surgical History:   Procedure Laterality Date     AS TOTAL KNEE ARTHROPLASTY       BUNIONECTOMY       HYSTERECTOMY       TUBAL/ECTOPIC PREGNANCY       Personal or family history of anesthesia problems: No    Family Hx:   Family History   Problem Relation Age of Onset     Coronary Artery Disease Maternal Grandmother           Specific Family Hx:       FH of DM: No       FH of CAD: Yes MGM - unknown heart failure - 60's.   FH of HTN: No       FH of Cancer: No  FH of Kidney Cancer: No    Personal Hx:   Social History     Socioeconomic History     Marital status:      Spouse name: Not on file     Number of children: Not on file     Years of education: Not on file     Highest education level: Not on file   Social Needs     Financial resource strain: Not on file     Food insecurity - worry: Not on file     Food insecurity - inability: Not on file     Transportation  needs - medical: Not on file     Transportation needs - non-medical: Not on file   Occupational History     Not on file   Tobacco Use     Smoking status: Never Smoker     Smokeless tobacco: Never Used   Substance and Sexual Activity     Alcohol use: Yes     Comment: 1-2 drinks/month     Drug use: No     Sexual activity: Not Currently   Other Topics Concern     Parent/sibling w/ CABG, MI or angioplasty before 65F 55M? Not Asked   Social History Narrative     Not on file          Specific Social Hx:       Health Insurance Status: Yes       Employment Status: Full time  Occupation: work full time at [employer] and part-time at [employer]                       Living Arrangements: lives with son.       Social Support: Yes       Presence of increased risk for disease transmission behaviors as defined by [facility] guidelines: No        Allergies:  Allergies   Allergen Reactions     Morphine Hcl Nausea     Vicodin [Hydrocodone-Acetaminophen] Nausea     Medications:  Prior to Admission medications    Medication Sig Start Date End Date Taking? Authorizing Provider   acetaminophen (ACETAMIN) 500 MG tablet Take 1-2 tablets by mouth every 6 hours as needed.    Reported, Patient   amitriptyline (ELAVIL) 25 MG tablet  12/28/18   Reported, Patient   amoxicillin (AMOXIL) 500 MG capsule Take 4 capsules by mouth 1 hour prior to dental appointment. 12/31/18   Reported, Patient   aspirin (ASA) 325 MG tablet Take 325 mg by mouth    Reported, Patient   Calcium Carbonate-Vitamin D (CALCIUM 600 + D OR) Take 1 tablet by mouth daily.    Reported, Patient   docusate sodium (COLACE) 100 MG capsule Take 1-2 capsules by mouth daily as needed.    Reported, Patient   methocarbamol (ROBAXIN) 500 MG tablet TAKE ONE TABLET BY MOUTH TWICE DAILY AS NEEDED 1/1/18   Reported, Patient   omega-3 fatty acids (FISH OIL) 1200 MG capsule Take 1 capsule by mouth daily.    Reported, Patient   SUMAtriptan (IMITREX) 100 MG tablet Take 1 tablet by mouth at onset  "of headache. May repeat once in 24 hours if needed.    Reported, Patient   zolpidem (AMBIEN CR) 12.5 MG CR tablet Take 1 tablet by mouth nightly as needed.    Reported, Patient     Vitals:  Vital Signs 8/9/2012 2/8/2019 2/8/2019   Systolic 102 - 120   Diastolic 68 - 79   Pulse 76 - 88   Weight (LB) 139 lb - 146 lb 6.4 oz   Height - - 5' 5\"   BMI (Calculated) - - 24.41   Pain - 0 -   O2 - - 98     Exam:   GENERAL APPEARANCE: alert and no distress  HENT: mouth without ulcers or lesions  LYMPHATICS: no cervical or supraclavicular nodes  RESP: lungs clear to auscultation - no rales, rhonchi or wheezes  CV: regular rhythm, normal rate, no rub, no murmur  EDEMA: no LE edema bilaterally  ABDOMEN: soft, nondistended, nontender, bowel sounds normal  MS: extremities normal - no gross deformities noted, no evidence of inflammation in joints, no muscle tenderness  SKIN: no rash    Results:   Labs and imaging were ordered for this visit and reviewed by me.  Recent Results (from the past 336 hour(s))   Uric acid    Collection Time: 02/08/19  7:20 AM   Result Value Ref Range    Uric Acid 3.7 2.6 - 6.0 mg/dL   CBC with platelets    Collection Time: 02/08/19  7:20 AM   Result Value Ref Range    WBC 4.4 4.0 - 11.0 10e9/L    RBC Count 4.81 3.8 - 5.2 10e12/L    Hemoglobin 13.5 11.7 - 15.7 g/dL    Hematocrit 42.0 35.0 - 47.0 %    MCV 87 78 - 100 fl    MCH 28.1 26.5 - 33.0 pg    MCHC 32.1 31.5 - 36.5 g/dL    RDW 12.2 10.0 - 15.0 %    Platelet Count 177 150 - 450 10e9/L   Partial thromboplastin time    Collection Time: 02/08/19  7:20 AM   Result Value Ref Range    PTT 26 22 - 37 sec   INR    Collection Time: 02/08/19  7:20 AM   Result Value Ref Range    INR 1.03 0.86 - 1.14   Hemoglobin A1c    Collection Time: 02/08/19  7:20 AM   Result Value Ref Range    Hemoglobin A1C 5.2 0 - 5.6 %   Phosphorus    Collection Time: 02/08/19  7:20 AM   Result Value Ref Range    Phosphorus 2.8 2.5 - 4.5 mg/dL   Comprehensive metabolic panel    Collection " Time: 02/08/19  7:20 AM   Result Value Ref Range    Sodium 142 133 - 144 mmol/L    Potassium 3.6 3.4 - 5.3 mmol/L    Chloride 108 94 - 109 mmol/L    Carbon Dioxide 28 20 - 32 mmol/L    Anion Gap 6 3 - 14 mmol/L    Glucose 94 70 - 99 mg/dL    Urea Nitrogen 16 7 - 30 mg/dL    Creatinine 0.86 0.52 - 1.04 mg/dL    GFR Estimate 71 >60 mL/min/[1.73_m2]    GFR Estimate If Black 83 >60 mL/min/[1.73_m2]    Calcium 8.2 (L) 8.5 - 10.1 mg/dL    Bilirubin Total 0.5 0.2 - 1.3 mg/dL    Albumin 3.8 3.4 - 5.0 g/dL    Protein Total 6.8 6.8 - 8.8 g/dL    Alkaline Phosphatase 49 40 - 150 U/L    ALT 20 0 - 50 U/L    AST 16 0 - 45 U/L   Lipid Profile    Collection Time: 02/08/19  7:20 AM   Result Value Ref Range    Cholesterol 184 <200 mg/dL    Triglycerides 65 <150 mg/dL    HDL Cholesterol 57 >49 mg/dL    LDL Cholesterol Calculated 114 (H) <100 mg/dL    Non HDL Cholesterol 127 <130 mg/dL   ABO/Rh type and screen    Collection Time: 02/08/19  7:20 AM   Result Value Ref Range    ABO A     RH(D) Pos     Antibody Screen Neg     Test Valid Only At          [facility]    Specimen Expires 02/11/2019    Protein  random urine with Creat Ratio    Collection Time: 02/08/19  8:16 AM   Result Value Ref Range    Protein Random Urine 0.08 g/L    Protein Total Urine g/gr Creatinine 0.07 0 - 0.2 g/g Cr   Albumin Random Urine Quantitative with Creat Ratio    Collection Time: 02/08/19  8:16 AM   Result Value Ref Range    Creatinine Urine 113 mg/dL    Albumin Urine mg/L 6 mg/L    Albumin Urine mg/g Cr 5.64 0 - 25 mg/g Cr   Routine UA with microscopic    Collection Time: 02/08/19  8:16 AM   Result Value Ref Range    Color Urine Yellow     Appearance Urine Clear     Glucose Urine Negative NEG^Negative mg/dL    Bilirubin Urine Negative NEG^Negative    Ketones Urine Negative NEG^Negative mg/dL    Specific Gravity Urine 1.016 1.003 - 1.035    Blood Urine Small (A) NEG^Negative    pH Urine 6.0 5.0 - 7.0 pH    Protein  Albumin Urine Negative NEG^Negative mg/dL    Urobilinogen mg/dL 0.0 0.0 - 2.0 mg/dL    Nitrite Urine Negative NEG^Negative    Leukocyte Esterase Urine Trace (A) NEG^Negative    Source Midstream Urine     WBC Urine 3 0 - 5 /HPF    RBC Urine 2 0 - 2 /HPF    Squamous Epithelial /HPF Urine 1 0 - 1 /HPF    Mucous Urine Present (A) NEG^Negative /LPF

## 2019-02-08 NOTE — DISCHARGE INSTRUCTIONS

## 2019-02-08 NOTE — PROGRESS NOTES
Donor Iohexol test    aJcque Villasenor presents today to [facility] for a Donor Iohexol test.      Progress note:  ID verified by name and .     The following information was verified with the patient:  Female Patients is there any possibility of being pregnant No  Is there a history of allergy (skin rash, swelling, ect) to:   A.  Iodine (except skin reactions to betadine): No   B. Intravenous radio-contrast agents: No   C. Seafood No    R.N. provided patient with educational handout regarding timed test. Yes    22 g. PIV placed in right AC and Iohexol administered over 2 minutes.  Positive blood return verified before and after injection. PIV removed.  20 gauge PIV placed in left  for blood draws and CT this afternoon.    Medication administered:  Iohexol (Omnipaque 300mg iodine/ml concentration) 5 mls.    Start time: 075  Stop time: 0753    Drug Waste Record    Drug Name: iohexol  Dose: 5ml  Route administered: IV  NDC #: 0407-1413-10  Amount of waste(mL):5ml  Reason for waste: Single use vial    Administrations This Visit     iohexol (OMNIPAQUE 300) injection 5 mL     Admin Date  2019 Action  Given Dose  5 mL Route  Intravenous Administered By  Danelle Ramos RN                    Evaluation nurse in transplant to draw labs at 2 and 4 hours post iohexol administration.  Patient given a slip with the times to get labs drawn and verbalized understanding of the plan.    Patient tolerated the procedure:  Yes    After the infusion patient was discharged to the next appointment.    Danelle Ramos RN

## 2019-02-08 NOTE — LETTER
2/8/2019      RE: Jacque Villasenor  [id number] [facility] [id number]       Chief Complaint   Patient presents with     Transplant Donor Evaluation     Pep donor     NAUN MADRIGAL [facility]      Transplant Evaluation Resource

## 2019-02-08 NOTE — LETTER
2/8/2019       RE: Jacque Villasenor  [id number] [facility] [place] [address]     Dear Colleague,    Thank you for referring your patient, Jacque Villasenor, to the [facility] at [facility]. Please see a copy of my visit note below.    Assessment and Plan:  1. Prospective kidney transplant donor with no issues that need to be addressed prior to donation. Patient's blood pressure is acceptable at this visit, kidney function appears to be possibly low with Iohexol pending, and urinalysis is bland.    GFR estimation: await Iohexol for direct measurement.      Hx of osteoporosis: on fosamax right now. This would have to be watched if GFR approached 30 ml / min post donation.     Hx of fibromyalgia: well controlled without NSAIDs.    Migraines: does not use NSAIDs for this.     Joint pain: uses intermittent methocarbamol for this.     Insomnia: PRN ambien use.     Overall the patient has no medical contraindication to being a live kidney donor.  We will discuss her candidacy at our multidisciplinary selection committee next week.    Discussed the risks of donating a kidney, including the surgical risk and the possible risks of living with one kidney.    Education about expected post-donation kidney function and how chronic kidney disease (CKD) and end stage kidney disease (ESKD) might potentially impact the donor in the future, include, but not limited to:       - On average, donors will have 25-35% permanent loss of kidney function at donation.       - Baseline risk of ESKD may slightly exceed that of members of the general population with the same demographic profile.       - Donor risks must be interpreted in light of known epidemiology of both CKD or ESKD, such as that CKD generally develops in midlife (40-50 years old) and ESKD generally develops after age 60.       - Medical evaluation of young potential donors cannot predict lifetime risk of CKD or ESKD.       -  Donors may be at higher risk for CKD if they sustain damage to the remaining kidney.       - Development of CKD and progression of ESKD may be more rapid with only 1 kidney.       - Some type of kidney replacement therapy, either kidney transplant or dialysis, is required when reaching ESKD.    Potential medical or surgical risks include, but not limited to:       - Death.       - Scars, pain, fatigue, and other consequences typical of any surgical procedure.       - Decreased kidney function.       - Abdominal or bowel symptoms, such as bloating and nausea, and developing bowel obstruction.       - Kidney failure (ESKD) and the need for a kidney transplant or dialysis for the donor.       - Impact of obesity, hypertension, or other donor-specific medical conditions on morbidity and mortality of the potential donor.    Patients overall evaluation will be discussed with the transplant team and a final recommendation on the patients' suitability to be a kidney transplant donor will be made at that time.    Consult:  Jacque Villasenor was seen in consultation at the request of Dr. Sudarshan Valladares for evaluation as a potential kidney transplant donor.    Reason for Visit:  Jacque Villasenor is a 64 year old female who presents for a kidney donor evaluation.  Patient would like to donate to a co-worker.    HPI:    Patient has a history of osteoporosis and osteoarthritis.     Glaucoma and cataracts in her eyes.     Currently on:    Amitryptiline: on this for arthritis; fibromyalgia  Methocarbamol:  Ambien: PRN for insomnia    Osteoporosis:     Fosamax - on this for 1 year.     Surgeries: knee arthroplasty x 2    Tubal, hysterectomy, bunion    No known family hx of kidney disease.     Today, she feels pretty good.     No ha, no vision changes, no dysphagia, no cp, no sob. No abdominal pain. No dysuria, no hematuria, no rash, achy, but nothing out of the ordinary. No numbness, weakness or tingling. No f/s/c, weight increase  of 6 lbs over last 6 months.            Kidney Disease Hx:       h/o Kidney Problems: No  Family h/o Genetic Kidney Disease: No       h/o HTN: No    Usual BP: Not checked       h/o Protein in Urine: No  h/o Blood in Urine: No       h/o Kidney Stones: No  h/o Kidney Injury: No       h/o Recurrent UTI: No  h/o Genitourinary Problems: No       h/o Chronic NSAID Use: No         Other Medical Hx:       h/o DM: No   h/o Gestational DM: No       h/o Preeclampsia: No          h/o Gastrointestinal, Pancreas or Liver Problems: No       h/o Lung or Heart Problems: No       h/o Hematologic Problems: No  h/o Bleeding or Clotting Problems: No       h/o Cancer: No       h/o Infection Problems: No         Skin Cancer Risk:       h/o more than 50 moles: No       h/o extensive sun exposure: Yes        h/o melanoma: No       Family h/o melanoma: Yes: mother - diagnosed at age 60         Mental Health Assessment:       h/o Depression: No       h/o Psychiatric Illness: No       h/o Suicidal Attempt(s): No    ROS:   A comprehensive review of systems was obtained and negative, except as noted in the HPI or PMH.    PMH:   History was taken from the patient as noted below.  Past Medical History:   Diagnosis Date     Cataract      Fibromyalgia      Glaucoma      Insomnia      Insomnia      Osteoarthritis      Osteoporosis      Osteoporosis        PSH:   Past Surgical History:   Procedure Laterality Date     AS TOTAL KNEE ARTHROPLASTY       BUNIONECTOMY       HYSTERECTOMY       TUBAL/ECTOPIC PREGNANCY       Personal or family history of anesthesia problems: No    Family Hx:   Family History   Problem Relation Age of Onset     Coronary Artery Disease Maternal Grandmother           Specific Family Hx:       FH of DM: No       FH of CAD: Yes MGM - unknown heart failure - 60's.   FH of HTN: No       FH of Cancer: No  FH of Kidney Cancer: No    Personal Hx:   Social History     Socioeconomic History     Marital status:      Spouse name:  Not on file     Number of children: Not on file     Years of education: Not on file     Highest education level: Not on file   Social Needs     Financial resource strain: Not on file     Food insecurity - worry: Not on file     Food insecurity - inability: Not on file     Transportation needs - medical: Not on file     Transportation needs - non-medical: Not on file   Occupational History     Not on file   Tobacco Use     Smoking status: Never Smoker     Smokeless tobacco: Never Used   Substance and Sexual Activity     Alcohol use: Yes     Comment: 1-2 drinks/month     Drug use: No     Sexual activity: Not Currently   Other Topics Concern     Parent/sibling w/ CABG, MI or angioplasty before 65F 55M? Not Asked   Social History Narrative     Not on file          Specific Social Hx:       Health Insurance Status: Yes       Employment Status: Full time  Occupation: work full time at [employer] center and part-time at [employer]                       Living Arrangements: lives with son.       Social Support: Yes       Presence of increased risk for disease transmission behaviors as defined by [facility] guidelines: No        Allergies:  Allergies   Allergen Reactions     Morphine Hcl Nausea     Vicodin [Hydrocodone-Acetaminophen] Nausea     Medications:  Prior to Admission medications    Medication Sig Start Date End Date Taking? Authorizing Provider   acetaminophen (ACETAMIN) 500 MG tablet Take 1-2 tablets by mouth every 6 hours as needed.    Reported, Patient   amitriptyline (ELAVIL) 25 MG tablet  12/28/18   Reported, Patient   amoxicillin (AMOXIL) 500 MG capsule Take 4 capsules by mouth 1 hour prior to dental appointment. 12/31/18   Reported, Patient   aspirin (ASA) 325 MG tablet Take 325 mg by mouth    Reported, Patient   Calcium Carbonate-Vitamin D (CALCIUM 600 + D OR) Take 1 tablet by mouth daily.    Reported, Patient   docusate sodium (COLACE) 100 MG capsule Take 1-2 capsules by mouth daily as needed.    Reported,  "Patient   methocarbamol (ROBAXIN) 500 MG tablet TAKE ONE TABLET BY MOUTH TWICE DAILY AS NEEDED 1/1/18   Reported, Patient   omega-3 fatty acids (FISH OIL) 1200 MG capsule Take 1 capsule by mouth daily.    Reported, Patient   SUMAtriptan (IMITREX) 100 MG tablet Take 1 tablet by mouth at onset of headache. May repeat once in 24 hours if needed.    Reported, Patient   zolpidem (AMBIEN CR) 12.5 MG CR tablet Take 1 tablet by mouth nightly as needed.    Reported, Patient     Vitals:  Vital Signs 8/9/2012 2/8/2019 2/8/2019   Systolic 102 - 120   Diastolic 68 - 79   Pulse 76 - 88   Weight (LB) 139 lb - 146 lb 6.4 oz   Height - - 5' 5\"   BMI (Calculated) - - 24.41   Pain - 0 -   O2 - - 98     Exam:   GENERAL APPEARANCE: alert and no distress  HENT: mouth without ulcers or lesions  LYMPHATICS: no cervical or supraclavicular nodes  RESP: lungs clear to auscultation - no rales, rhonchi or wheezes  CV: regular rhythm, normal rate, no rub, no murmur  EDEMA: no LE edema bilaterally  ABDOMEN: soft, nondistended, nontender, bowel sounds normal  MS: extremities normal - no gross deformities noted, no evidence of inflammation in joints, no muscle tenderness  SKIN: no rash    Results:   Labs and imaging were ordered for this visit and reviewed by me.  Recent Results (from the past 336 hour(s))   Uric acid    Collection Time: 02/08/19  7:20 AM   Result Value Ref Range    Uric Acid 3.7 2.6 - 6.0 mg/dL   CBC with platelets    Collection Time: 02/08/19  7:20 AM   Result Value Ref Range    WBC 4.4 4.0 - 11.0 10e9/L    RBC Count 4.81 3.8 - 5.2 10e12/L    Hemoglobin 13.5 11.7 - 15.7 g/dL    Hematocrit 42.0 35.0 - 47.0 %    MCV 87 78 - 100 fl    MCH 28.1 26.5 - 33.0 pg    MCHC 32.1 31.5 - 36.5 g/dL    RDW 12.2 10.0 - 15.0 %    Platelet Count 177 150 - 450 10e9/L   Partial thromboplastin time    Collection Time: 02/08/19  7:20 AM   Result Value Ref Range    PTT 26 22 - 37 sec   INR    Collection Time: 02/08/19  7:20 AM   Result Value Ref Range "    INR 1.03 0.86 - 1.14   Hemoglobin A1c    Collection Time: 02/08/19  7:20 AM   Result Value Ref Range    Hemoglobin A1C 5.2 0 - 5.6 %   Phosphorus    Collection Time: 02/08/19  7:20 AM   Result Value Ref Range    Phosphorus 2.8 2.5 - 4.5 mg/dL   Comprehensive metabolic panel    Collection Time: 02/08/19  7:20 AM   Result Value Ref Range    Sodium 142 133 - 144 mmol/L    Potassium 3.6 3.4 - 5.3 mmol/L    Chloride 108 94 - 109 mmol/L    Carbon Dioxide 28 20 - 32 mmol/L    Anion Gap 6 3 - 14 mmol/L    Glucose 94 70 - 99 mg/dL    Urea Nitrogen 16 7 - 30 mg/dL    Creatinine 0.86 0.52 - 1.04 mg/dL    GFR Estimate 71 >60 mL/min/[1.73_m2]    GFR Estimate If Black 83 >60 mL/min/[1.73_m2]    Calcium 8.2 (L) 8.5 - 10.1 mg/dL    Bilirubin Total 0.5 0.2 - 1.3 mg/dL    Albumin 3.8 3.4 - 5.0 g/dL    Protein Total 6.8 6.8 - 8.8 g/dL    Alkaline Phosphatase 49 40 - 150 U/L    ALT 20 0 - 50 U/L    AST 16 0 - 45 U/L   Lipid Profile    Collection Time: 02/08/19  7:20 AM   Result Value Ref Range    Cholesterol 184 <200 mg/dL    Triglycerides 65 <150 mg/dL    HDL Cholesterol 57 >49 mg/dL    LDL Cholesterol Calculated 114 (H) <100 mg/dL    Non HDL Cholesterol 127 <130 mg/dL   ABO/Rh type and screen    Collection Time: 02/08/19  7:20 AM   Result Value Ref Range    ABO A     RH(D) Pos     Antibody Screen Neg     Test Valid Only At          [facility]    Specimen Expires 02/11/2019    Protein  random urine with Creat Ratio    Collection Time: 02/08/19  8:16 AM   Result Value Ref Range    Protein Random Urine 0.08 g/L    Protein Total Urine g/gr Creatinine 0.07 0 - 0.2 g/g Cr   Albumin Random Urine Quantitative with Creat Ratio    Collection Time: 02/08/19  8:16 AM   Result Value Ref Range    Creatinine Urine 113 mg/dL    Albumin Urine mg/L 6 mg/L    Albumin Urine mg/g Cr 5.64 0 - 25 mg/g Cr   Routine UA with microscopic    Collection Time: 02/08/19  8:16 AM   Result Value Ref Range    Color  Urine Yellow     Appearance Urine Clear     Glucose Urine Negative NEG^Negative mg/dL    Bilirubin Urine Negative NEG^Negative    Ketones Urine Negative NEG^Negative mg/dL    Specific Gravity Urine 1.016 1.003 - 1.035    Blood Urine Small (A) NEG^Negative    pH Urine 6.0 5.0 - 7.0 pH    Protein Albumin Urine Negative NEG^Negative mg/dL    Urobilinogen mg/dL 0.0 0.0 - 2.0 mg/dL    Nitrite Urine Negative NEG^Negative    Leukocyte Esterase Urine Trace (A) NEG^Negative    Source Midstream Urine     WBC Urine 3 0 - 5 /HPF    RBC Urine 2 0 - 2 /HPF    Squamous Epithelial /HPF Urine 1 0 - 1 /HPF    Mucous Urine Present (A) NEG^Negative /LPF             Again, thank you for allowing me to participate in the care of your patient.      Sincerely,     Kidney Donor Scooby

## 2019-02-08 NOTE — PROGRESS NOTES
"Psychosocial Evaluation  Living Organ Donation per OPTN Policy 14.1.A  Organ Type: Unrelated Kidney Donor  Presenting Information:  Jacque \"Melisa\" Hamilton presents to the [facility] to complete a psychosocial evaluation since she is interested in becoming a kidney donor for Chavez Rutledge.  Ms. Villasenor is a 64 year old  female.  She was casually dressed and groomed.  Her mood was euthymic.  Thought process logical and goal directed.  She came to the transplant center alone today.  She was pleasant and forthcoming in sharing information for this evaluation.  PERSONAL BACKGROUND:  Current Living Situation: Melisa owns a town home in [place], where she has lived for the past 16 years.  One of her sons also lives with her.    Education/Employment/Financial Situation: Melisa has a high school education.  She works full time at the [employer], in .  She has vacation time and sick time available to her.  Additionally, they have a program where co-workers can donate vacation time to others needing some.  She does not anticipate lost wages due to donation.      Health Insurance Status: Has medical insurance    Family History: Melisa was born and raised in [place], [place].  Her mother lives in Assisted living.  Her father  five months ago.  He had dementia.  She believes that his move into memory care and away from his wife was too much stress and most likely contributed to his death.  She has four brothers and one sister.  There has been conflict between siblings, especially over the past years as they have tried to manage her father's illness.  She is closest to her one sister.    Melisa  18 years ago.  She has three adult children.  Franklin, age 42 works in the genetic department here at the [employer].  She does not yet know that Melisa is considering donation.  She states her daughter can be rather judgmental and would prefer to have " "this conversation with her if/when approved for donation.  Melisa's 40 year old son,  Wes currently lives with her.  He is a .  Hor other son, Max, age 34 lives in [place], [place].    General Health: Melisa considers herself to be generally healthy.  She had knee replacement surgery six years ago and felt she did good with this.    Mental Health: She denies any current or past mental health issues.  She has never had reason to seek out the support of a therapist or psychiatrist.  She had never been prescribed neuroleptics.  No known family history of mental illness.    Alcohol and Drug Use/Abuse/Dependency: She drinks alcohol approximately three times per month.  She denies any current or past chemical abuse issues.  No known family history of chemical dependency.  She does not use street drugs.    Cigarette Use: Denies    Legal: Denies    Coping with major surgery/associated stress: She coped will with past knee surgeries and anticipates this will also be the past with kidney donation.    Support System: She has a close network of core friends.  Her adult children are supportive and are her co-workers.    DONOR SPECIFIC INFORMATION:  Relationship to Recipient: The recipient is a co-worker,Chavez Rutledge.  They have worked together for 18 years.  He is .  She describes him as a \"good carlos manuel\".  He has been on dialysis for about one year.    Decision Process/Motivation to Donate: She denies pressure, coercion or inducement to donate.  She told him years ago that if he ever needed a kidney, she could donate hers to him.  It is her understanding that some other co-worker have pursued donation but have been denied for unknown reasons.  She decided that she would pursue donation, hoping that a transplant will help him to feel better.  She does not expect anything in return for donation.  She states her sons would prefer she not undergo surgery for donation, but overall are supportive of whatever she " decides to do.    PREPARATION FOR DONATION, RECOVERY, AND POTENTIAL SHORT-LONG-TERM OUTCOMES:  Understanding of the Living Donation Process:   We discussed the role of the donor  and Independent Donor Advocate.  Short and long term medical and psychosocial risks to both, donor and recipient were reviewed and she is capable of understanding the risks.  High risk behaviors as defined by [place] Public Health Services (PHS) 2013 that have potential to increase risk of disease transmission were reviewed and she denies high risk behaviors. Post surgical restrictions (2 weeks no driving, 6 weeks no lifting over 10 lbs) were reviewed and she appears capable of adhering to the post surgical requirements. The need for a caregiver was discussed and her adult son who lives with her, along with friends, would be able to help care for her .  The risk of poor psychosocial outcome including problems with body image, post-surgery depression or anxiety, or feelings of emotional distress or bereavement if recipient experiences any recurrent disease, poor outcome or death was reviewed.  Additionally, potential financial implications, including the risk of having difficulty obtaining health care insurance, life insurance, disability insurance, or long term care insurance were reviewed, as were available donor grants to assist with donor related expenses.      We also discussed some unique issues that arise with paired kidney donation, which include the uncertainty of the timing and the importance of having a employment situation and support system that is able to provide sustained support and flexibility.    She appears capable of understanding this information and making an informed medical decision.    Impressions/Recommendations:   She  is highly motivated to donate a kidney  to Chavez Rutledge.  Her decision to donate is free of inducement, coercion, or other undue pressure.   Her housing, finances and employment are stable.   No current/active mental health or chemical abuse issues were identified.  The need for a caregiver was reviewed and she is able to identify a plan to meet her post operative care needs.  She appears capable of making an informed medical decision.  No psychosocial contraindications to living organ donation were identified and  I support Melisa Villasenor s desire to donate a kidney to Chavez Rutledge.         Contact Information:   GERA ALTMAN, [facility]  Clinical  and Independent Donor Advocate  [facility]  Phone - [phone]  Pager - [phone]  [email or web address]      Time Spent: 60 minutes      Living Kidney Donor Consent per OPTN Policy 14.3.A for Independent Living Donor Advocate (TOREY)    Written assurance has been obtained from the potential donor that he/she:   Is willing to donate  Is free from inducement and coercion  Has been informed that the he/she may decline to donate at any time  Has been informed that transplant centers must:   A) Offer donors an opportunity to discontinue the donor consent or evaluation process in a way that is protected and confidential  B) Provide an independent living donor advocate (TOREY) to assist the potential donor during this process    The following was presented to the potential donor in a language in which the potential donor is able to engage in meaningful dialogue:   Education and instruction about all phases of the living donation process including:   Consent  Medical and psychosocial evaluation  Information about the surgical procedure  Pre and post operative care  Benefits of post operative follow up  Disclosure that the recovery hospital will take all reasonable precautions to provide confidentiality for the donor/recipient  Disclosure that it is a federal crime for any person to knowingly acquire, obtain or otherwise transfer any human organ for valuable consideration  Disclosure that the recovery hospital must provide an independent living donor  advocate (TOREY)  Disclosure that health information obtained during the evaluation is subject to the same regulations as all records and could reveal conditions that must be reported to local, state, or federal public health authorities  Disclosure that the [facility] is required to report living donor follow up information at 6 months, 1 year, and 2 years, and that the potential donor must commit to post operative follow up testing coordinated by the [facility]    Disclosure has been provided that these risks may be transient or permanent & include but are not limited to:  Potential psychosocial risks:  Problems with body image  Post-surgery depression or anxiety  Feelings of emotional distress or bereavement if recipient experiences any recurrent disease or in the event of the recipient s death  Impact of donation on the donor s lifestyle    Potential financial impacts:  Personal expenses of travel, housing, , lost wages related to donation might not be reimbursed. However, resources may be available to defray some donation-related expenses   Need for life-long follow up at the donor s expense  Loss of employment or income  Negative impact on the ability to obtain future employment  Negative impact on the ability to obtain, maintain, or afford health, disability, and life insurance  Future health problems experienced by living donors following donation may not be covered by the recipient s insurance    Contact Information:  GERA ALTMAN, [facility]  Clinical  and Independent Donor Advocate  [facility]  Phone - [phone]  Pager - [phone]  [email or web address]      Time Spent: 60 minutes

## 2019-02-09 NOTE — PROGRESS NOTES
Chief Complaint   Patient presents with     Transplant Donor Evaluation     Pep donor     NAUN MADRIGAL CMA

## 2019-02-10 LAB
GAMMA INTERFERON BACKGROUND BLD IA-ACNC: 0.03 IU/ML
M TB IFN-G BLD-IMP: NEGATIVE
M TB IFN-G CD4+ BCKGRND COR BLD-ACNC: >10 IU/ML
MITOGEN IGNF BCKGRD COR BLD-ACNC: 0 IU/ML
MITOGEN IGNF BCKGRD COR BLD-ACNC: 0.01 IU/ML

## 2019-02-11 LAB — INTERPRETATION ECG - MUSE: NORMAL

## 2019-02-12 LAB
BSA: 1.75 M2
IOHEXOL CL UR+SERPL-VRATE: 10.25 MG/DL
IOHEXOL CL UR+SERPL-VRATE: 5.66 MG/DL
IOHEXOL CL UR+SERPL-VRATE: 76 /1.73 M2
IOHEXOL CL UR+SERPL-VRATE: 77 ML/MIN

## 2019-02-13 ENCOUNTER — COMMITTEE REVIEW (OUTPATIENT)
Dept: TRANSPLANT | Facility: CLINIC | Age: 64
End: 2019-02-13

## 2019-02-14 ENCOUNTER — TELEPHONE (OUTPATIENT)
Dept: TRANSPLANT | Facility: CLINIC | Age: 64
End: 2019-02-14

## 2019-02-14 NOTE — TELEPHONE ENCOUNTER
I called the patient to review the results of her donor evaluation and the recommendations from the Donor Selection Meeting.  I reviewed that we need to repeat her UA.  I will send her an order and she will go to [facility].  I reviewed her results of the Iohexol is 77 which meets the criteria for persons greater than age 60.  I reivewed the CT report notations and that [facility] so that [facility] have record of her images of the CT for future.  I reviewed the notation of prominent pancreatic duct and that no further imaging is needed, but Primary care should have information if she should have any signs of pancreatitis in the future.  I told her the choice of the kidney would be left kidney.    I answered her questions and I will fax order to [facility] for her repeat UA.

## 2019-02-20 ENCOUNTER — COMMITTEE REVIEW (OUTPATIENT)
Dept: TRANSPLANT | Facility: CLINIC | Age: 64
End: 2019-02-20

## 2019-02-20 ENCOUNTER — TELEPHONE (OUTPATIENT)
Dept: TRANSPLANT | Facility: CLINIC | Age: 64
End: 2019-02-20

## 2019-02-20 NOTE — COMMITTEE REVIEW
Living Donor Committee Review Note Evaluation Date: 2/8/2019  Committee Review Date: 2/20/2019    Donor being evaluated for: Kidney    Transplant Phase: Evaluation  Transplant Status: Active    Transplant Coordinator: Ivone Higuera  Transplant Surgeon:       Committee Review Members:  Nephrology Garrick Fuentes MD, Thomas Lopez MD   Nutrition Michelle Loja,    Pharmacy Joselo Brown, [facility]    - Clinical Lexie Avila, [facility], Waleska Rick, [facility]   Transplant Kailey Biswas RN, Sudarshan Valladares MD, Dayan Dunn, LPN, Ivone Higuera RN, Luis Alberto Alexis MD       Transplant Eligibility: Acceptable Mental Health    Committee Review Decision: Needs Re-presentation    Relative Contraindications:     Absolute Contraindications:     Committee Chair Sudarshan Valladares MD verbally attested to the committee's decision.    Committee Discussion Details: Reviewed her repeat Urinalysis results of trace blood.  The Committee recommends patient to have Urology workup and if that is cleared she would be approved.

## 2019-02-20 NOTE — TELEPHONE ENCOUNTER
I reviewed with the patient her results of trace blood and recommendation from the Donor team that she have consult with Urology.  I explained that the trend of having blood needs further workup before she can be a donor.  I answered her questions on the testing that Urology does typically in these cases.  She will call her PCP and get the referral started immediately.  She is bummed as she was hoping to be done.  I reassured her and she stated understanding that we need to be confident for her to be free of any issues going into surgery such as a tumer.    I stated that I will ask the Admins to have her CT images pushed to [facility] in case the team there needs to visualize her CT abdomen that she had with our facility.  She would also like to get a copy of her CT report as she has been unable to read it on Create.

## 2019-03-09 ENCOUNTER — HEALTH MAINTENANCE LETTER (OUTPATIENT)
Age: 64
End: 2019-03-09

## 2019-03-11 ENCOUNTER — TRANSFERRED RECORDS (OUTPATIENT)
Dept: HEALTH INFORMATION MANAGEMENT | Facility: CLINIC | Age: 64
End: 2019-03-11

## 2019-03-12 ENCOUNTER — TELEPHONE (OUTPATIENT)
Dept: TRANSPLANT | Facility: CLINIC | Age: 64
End: 2019-03-12

## 2019-03-12 NOTE — TELEPHONE ENCOUNTER
I spoke with Dr Hitesh Banda of the [facility].  He stated that when checking the patients urine it was negative and there was no clinical indication for any further testing.  He took the patients history and she is low risk.  He has written his consult report and he wanted to let us know that there is no further workup needed.  He had seen her past labs and since the micro RBC were normal 0-2 when her previous labs showed trace macro he felt that it was okay.    He will have his consult note and lab results faxed to our office.

## 2019-03-12 NOTE — TELEPHONE ENCOUNTER
I spoke to the patient today.  She had been seen by [facility] and was told there is no concern for her urine.  They rechecked the urine and it was negative for blood.  I told her that I will speak with the Urologist and present her evaluation tomorrow and my hope is that I can review any further recommendations with her tomorrow.

## 2019-03-13 ENCOUNTER — COMMITTEE REVIEW (OUTPATIENT)
Dept: TRANSPLANT | Facility: CLINIC | Age: 64
End: 2019-03-13

## 2019-03-13 ENCOUNTER — TELEPHONE (OUTPATIENT)
Dept: TRANSPLANT | Facility: CLINIC | Age: 64
End: 2019-03-13

## 2019-03-13 NOTE — COMMITTEE REVIEW
Living Donor Committee Review Note Evaluation Date: 2/8/2019  Committee Review Date: 3/13/2019    Donor being evaluated for: Kidney    Transplant Phase: Evaluation  Transplant Status: Active    Transplant Coordinator: Ivone Higuera  Transplant Surgeon:       Committee Review Members:  Nephrology Garrick Fuentes MD, Thomas Lopez MD, Viral Dejan Castellanos MD   Nutrition Michelle Loja,    Pharmacy Joselo Brown, [facility]    - Clinical Lexie Avila, [facility], Waleska Rick, [facility]   Transplant Kailey Biswas RN, Dayan Dunn LPN, Ivone Higuera RN, Prasanna Tamez MD, Luis Alberto Alexis MD, Barbara Hilario MD, MD       Transplant Eligibility: Acceptable Mental Health, Acceptable Physical Health    Committee Review Decision: Approved    Relative Contraindications: None    Absolute Contraindications: None    Committee Chair Prasanna Tamez MD verbally attested to the committee's decision.    Committee Discussion Details: Reviewed Urology clearance consultation report.  Patient Urinalysis was normal no further testing is needed.  Patient is approved.

## 2019-03-13 NOTE — TELEPHONE ENCOUNTER
I reviewed with the patient that the Donor Committee has approved her.  No further testing needed.  I reviewed with her that she will need her annual mammogram at the end of March and she stated understanding and has a plan to do it when it has been a year.    She would like to time her donation with Graduation activity.  Likely June would be best.  She will tell her co worker friend who is the recipient.  I will work with him on getting consents for [employer].    I reviewed listing process and timing once we get a match offer.  She will be seeing a GI specialist on her own and will need assistance getting her CT images to them.  She will send me the information.

## 2019-03-15 DIAGNOSIS — Z52.4 KIDNEY DONOR: Primary | ICD-10-CM

## 2019-03-19 ENCOUNTER — DOCUMENTATION ONLY (OUTPATIENT)
Dept: TRANSPLANT | Facility: CLINIC | Age: 64
End: 2019-03-19

## 2019-03-19 NOTE — PHARMACY-MEDICATION REGIMEN REVIEW
Pharmacy Living Kidney Donor Medication Evaluation     This patient is a 64 year old female being considered for living kidney donation. As part of the kidney pre-donation patient evaluation, pharmacy has screened this patient's electronic medical record for medication related concerns.    Assessment / Plan    No significant potential medication related issues are expected for this patient post surgery, based on the medical record medication list review.  Pharmacy will continue to participate in this patient's care throughout the surgery course.  Please contact pharmacy with any further medication related questions or concerns.       LISA Stacy.Ph.  [facility]  [phone] pager

## 2019-03-21 LAB
A* LOCUS: NORMAL
ABTEST METHOD: NORMAL
B* LOCUS: NORMAL
B*: NORMAL
BW-1: NORMAL
C* LOCUS: NORMAL
C*: NORMAL
DPA1* LOCUS NMDP: NORMAL
DPA1* NMDP: NORMAL
DPA1*: NORMAL
DPA1*LOCUS: NORMAL
DPB1* LOCUS NMDP: NORMAL
DPB1* NMDP: NORMAL
DPB1*: NORMAL
DPB1*LOCUS: NORMAL
DQA1*: NORMAL
DQA1*LOCUS: NORMAL
DQB1* LOCUS: NORMAL
DQB1*: NORMAL
DRB1* LOCUS: NORMAL
DRB1*: NORMAL
DRB4* LOCUS: NORMAL
DRB4*: NORMAL
DRSSO TEST METHOD: NORMAL

## 2019-04-30 ENCOUNTER — TELEPHONE (OUTPATIENT)
Dept: TRANSPLANT | Facility: CLINIC | Age: 64
End: 2019-04-30

## 2019-05-31 DIAGNOSIS — Z00.5 EXAMINATION OF POTENTIAL DONOR OF ORGAN AND TISSUE: Primary | ICD-10-CM

## 2019-06-04 ENCOUNTER — TELEPHONE (OUTPATIENT)
Dept: TRANSPLANT | Facility: CLINIC | Age: 64
End: 2019-06-04

## 2019-06-04 NOTE — TELEPHONE ENCOUNTER
I returned Jacque's call.  She was wondering about her [facility] paper work.  She said her donation is scheduled for July 3rd.   I will reach out to CHRIS and ak me if she would like me to complete or if she will complete when she returns to the office.

## 2019-06-06 ENCOUNTER — DOCUMENTATION ONLY (OUTPATIENT)
Dept: TRANSPLANT | Facility: CLINIC | Age: 64
End: 2019-06-06

## 2019-06-06 NOTE — PROGRESS NOTES
I have sent the patient the completed FMLA paperwork for [facility] and [facility] via secure email format.  She had called today to ensure that they are completed so she can submit them by the deadline.  We reviewed date of surgery July 3 and preop visit June 27.

## 2019-06-10 ENCOUNTER — HOSPITAL ENCOUNTER (INPATIENT)
Facility: CLINIC | Age: 64
Setting detail: SURGERY ADMIT
End: 2019-06-10
Attending: TRANSPLANT SURGERY | Admitting: TRANSPLANT SURGERY
Payer: COMMERCIAL

## 2019-06-10 DIAGNOSIS — Z00.5 EXAMINATION OF POTENTIAL DONOR OF ORGAN AND TISSUE: Primary | ICD-10-CM

## 2019-06-14 ENCOUNTER — TELEPHONE (OUTPATIENT)
Dept: TRANSPLANT | Facility: CLINIC | Age: 64
End: 2019-06-14

## 2019-06-14 NOTE — TELEPHONE ENCOUNTER
Patient left a VM message on 6/14/19 at 9:20 am has some questions regarding information she received in the mail.

## 2019-06-20 ENCOUNTER — TELEPHONE (OUTPATIENT)
Dept: TRANSPLANT | Facility: CLINIC | Age: 64
End: 2019-06-20

## 2019-06-20 NOTE — TELEPHONE ENCOUNTER
I received a call from the patient today.  She stated her Mother is expected to only live a few more days.  She is in hospice care.  She would like to postpone her donation surgery a few weeks.  She feels she needs to focus on her self right now and deal with the .  She said she feels 100 % engaged in being a kidney donor and she would like that conveyed to the anonymous matched recipient.  She says that it is okay to tell the recipient she is matched with that she is postponing due to death in family.    She would like the new date to be .  I will communicate this to the recipient team and make arrangements for new surgery scheduling.   I offered our team, Donor  or myself if she feels that she needs to talk with anyone regarding grief.  I will let the Donor Social work team know.

## 2019-06-25 ENCOUNTER — TELEPHONE (OUTPATIENT)
Dept: TRANSPLANT | Facility: CLINIC | Age: 64
End: 2019-06-25

## 2019-06-25 NOTE — TELEPHONE ENCOUNTER
Patient Call: General  Route to LPN    Reason for call: PT need to reschedule her Pre Op on June 27th 2019 due to family passing away,    Call back needed? Yes    Return Call Needed  Same as documented in contacts section  When to return call?: Same day: Route High Priority

## 2019-06-25 NOTE — TELEPHONE ENCOUNTER
The patient was concerned that her appointments for June 27 have not been cancelled yet.  I updated her that I am submitting the reschedule surgery orders and our team will remove the old dates and insert the new ones.  She confirmed that she can be at clinic to see Dr Zaldivar on July 16 preop and surgery date July 24 works for her.  We reviewed the [facility] papers that now need to be resubmitted.  I have faxed both [employer] with the new dates and I have faxed [employer] the letterhead letter explaining the rescheduled dates and new surgeon.  Will task schedulers the new orders.

## 2019-06-26 ENCOUNTER — TELEPHONE (OUTPATIENT)
Dept: TRANSPLANT | Facility: CLINIC | Age: 64
End: 2019-06-26

## 2019-06-26 NOTE — TELEPHONE ENCOUNTER
"D/I:  Phone call to Melisa today to express my condolences after learning of her mother's death and to also assess for potential need to postpone donation due to grief and loss.  Jacque's mother was 89 years of age and  one week ago.  Jacque states that her mother lived a long and healthy life, which she is thankful for.  She denies depression at this time and feels she is adjusting well to the loss.  She states she is fortunate to have never had to deal with depression or mental illness in the past.  She states it was her decision to let us know about the death as she felt it necessary to postpone donation while making arrangements for her mother.  She is rescheduled for surgery the end of July, which she feels is a good time for her.  Her son who lives with her is a teacher and is off of school and able to help care for her.  She is donating through [employer] and her intended recipient's surgery will go forward as planned on Tuesday.  She asked that her actual recipient know that she is only changing the date due to a death in the family, and she in no way intends to back out on donating.  This information was passed on to her coordinator earlier this week.  Her middle son, the one who lives with her, \"doesn't care so much about her donating\" and she has provided him with educational information.  He will be able to care for her.  She describes herself as \"independent and strong\" and doesn't want to lean on others more than necessary.  She has joined a donor group on Facebook, which she has found to be quite informative and helpful.  A:  I offered support after learning of her mother's death.  She appears to be coping well and would not want to postpone donation any longer as she is highly motivated.  However, I let her know that she is able to postpone donation at any time, and there is no pressure to move forward with surgery.  She currently expresses a strong desire to move forward with scheduled surgery and I am " supportive of this plan.  Her family is aware of her plans to donate and will support her as needed during recovery.  P:  I encouraged her to call with any questions or concerns, for support and advocacy as needed.  She knows how to contact me and her coordinator as needed.

## 2019-07-16 ENCOUNTER — ALLIED HEALTH/NURSE VISIT (OUTPATIENT)
Dept: TRANSPLANT | Facility: CLINIC | Age: 64
End: 2019-07-16
Attending: TRANSPLANT SURGERY

## 2019-07-16 ENCOUNTER — OFFICE VISIT (OUTPATIENT)
Dept: TRANSPLANT | Facility: CLINIC | Age: 64
End: 2019-07-16
Attending: TRANSPLANT SURGERY

## 2019-07-16 ENCOUNTER — OFFICE VISIT (OUTPATIENT)
Dept: TRANSPLANT | Facility: CLINIC | Age: 64
End: 2019-07-16
Attending: TRANSPLANT SURGERY
Payer: COMMERCIAL

## 2019-07-16 ENCOUNTER — DOCUMENTATION ONLY (OUTPATIENT)
Dept: TRANSPLANT | Facility: CLINIC | Age: 64
End: 2019-07-16

## 2019-07-16 ENCOUNTER — APPOINTMENT (OUTPATIENT)
Dept: TRANSPLANT | Facility: CLINIC | Age: 64
End: 2019-07-16
Attending: TRANSPLANT SURGERY

## 2019-07-16 VITALS — HEART RATE: 88 BPM | SYSTOLIC BLOOD PRESSURE: 92 MMHG | OXYGEN SATURATION: 98 % | DIASTOLIC BLOOD PRESSURE: 62 MMHG

## 2019-07-16 DIAGNOSIS — Z00.5 EXAMINATION OF POTENTIAL DONOR OF ORGAN AND TISSUE: Primary | ICD-10-CM

## 2019-07-16 DIAGNOSIS — Z00.5 EXAMINATION OF POTENTIAL DONOR OF ORGAN AND TISSUE: ICD-10-CM

## 2019-07-16 DIAGNOSIS — Z52.4 KIDNEY DONOR: Primary | ICD-10-CM

## 2019-07-16 LAB
ABO + RH BLD: NORMAL
ABO + RH BLD: NORMAL
ALBUMIN UR-MCNC: NEGATIVE MG/DL
APPEARANCE UR: CLEAR
BILIRUB UR QL STRIP: NEGATIVE
BLD GP AB SCN SERPL QL: NORMAL
BLOOD BANK CMNT PATIENT-IMP: NORMAL
COLOR UR AUTO: YELLOW
CREAT SERPL-MCNC: 0.78 MG/DL (ref 0.52–1.04)
GFR SERPL CREATININE-BSD FRML MDRD: 80 ML/MIN/{1.73_M2}
GLUCOSE UR STRIP-MCNC: NEGATIVE MG/DL
HGB BLD-MCNC: 13.6 G/DL (ref 11.7–15.7)
HGB UR QL STRIP: ABNORMAL
HLA INTERIM CROSSMATCH DONOR: NORMAL
KETONES UR STRIP-MCNC: NEGATIVE MG/DL
LEUKOCYTE ESTERASE UR QL STRIP: NEGATIVE
MUCOUS THREADS #/AREA URNS LPF: PRESENT /LPF
NITRATE UR QL: NEGATIVE
PH UR STRIP: 5 PH (ref 5–7)
RBC #/AREA URNS AUTO: 1 /HPF (ref 0–2)
SOURCE: ABNORMAL
SP GR UR STRIP: 1.02 (ref 1–1.03)
SPECIMEN EXP DATE BLD: NORMAL
SQUAMOUS #/AREA URNS AUTO: <1 /HPF (ref 0–1)
UROBILINOGEN UR STRIP-MCNC: 0 MG/DL (ref 0–2)
WBC #/AREA URNS AUTO: <1 /HPF (ref 0–5)

## 2019-07-16 PROCEDURE — 86901 BLOOD TYPING SEROLOGIC RH(D): CPT | Performed by: TRANSPLANT SURGERY

## 2019-07-16 PROCEDURE — 86850 RBC ANTIBODY SCREEN: CPT | Performed by: TRANSPLANT SURGERY

## 2019-07-16 PROCEDURE — 85018 HEMOGLOBIN: CPT | Performed by: TRANSPLANT SURGERY

## 2019-07-16 PROCEDURE — 86644 CMV ANTIBODY: CPT | Performed by: TRANSPLANT SURGERY

## 2019-07-16 PROCEDURE — 86900 BLOOD TYPING SEROLOGIC ABO: CPT | Performed by: TRANSPLANT SURGERY

## 2019-07-16 PROCEDURE — 81001 URINALYSIS AUTO W/SCOPE: CPT | Performed by: TRANSPLANT SURGERY

## 2019-07-16 PROCEDURE — 82565 ASSAY OF CREATININE: CPT | Performed by: TRANSPLANT SURGERY

## 2019-07-16 NOTE — LETTER
7/16/2019       RE: Jacque Villasenor  [address] [place] [address]     Dear Colleague,    Thank you for referring your patient, Jacque Villasenor, to the [facility] SOLID ORGAN TRANSPLANT at [facility]. Please see a copy of my visit note below.    See surgeon note     Again, thank you for allowing me to participate in the care of your patient.      Sincerely,    Elle Brasher NP

## 2019-07-16 NOTE — LETTER
7/16/2019       RE: Jacque Villasenor  [id number]  [facility] [place] [facility] [address]     Dear Colleague,    Thank you for referring your patient, Jacque Villasenor, to the [facility] SOLID ORGAN TRANSPLANT at [facility]. Please see a copy of my visit note below.    Transplant Surgery H&P                                                        HPI:                                                      Ms. Villasenor is a 64 year old female who comes to clinic today for preop prior to planned laparoscopic kidney donation surgery. The patient was previously reviewed by the living donor multidisciplinary selection committee and found to be medically and psychosocially appropriate for voluntary kidney donation. The patient denies any feelings of being pressured to proceed with kidney donation.  Health events since donor evaluation: None    Special considerations:   Constipation: yes  PONV: no  History of Urinary retention? no  Significant neck/back/joint concerns for lateral decubitus positioning?: No  Yazdanism: No    MEDICAL HISTORY:                                                      Patient Active Problem List    Diagnosis Date Noted     Transplant donor evaluation 01/23/2019     Priority: Medium     CARDIOVASCULAR SCREENING; LDL GOAL LESS THAN 160 10/08/2012     Priority: Medium      Past Medical History:   Diagnosis Date     Cataract      Fibromyalgia      Glaucoma      Insomnia      Insomnia      Osteoarthritis      Osteoporosis      Osteoporosis      Past Surgical History:   Procedure Laterality Date     AS TOTAL KNEE ARTHROPLASTY       BUNIONECTOMY       HYSTERECTOMY       TUBAL/ECTOPIC PREGNANCY       Current Outpatient Medications   Medication Sig Dispense Refill     acetaminophen (ACETAMIN) 500 MG tablet Take 1-2 tablets by mouth every 6 hours as needed.       amitriptyline (ELAVIL) 25 MG tablet        Calcium Carbonate-Vitamin D (CALCIUM 600 + D OR) Take 1 tablet by  mouth daily.       docusate sodium (COLACE) 100 MG capsule Take 1-2 capsules by mouth daily as needed.       methocarbamol (ROBAXIN) 500 MG tablet TAKE ONE TABLET BY MOUTH TWICE DAILY AS NEEDED       omega-3 fatty acids (FISH OIL) 1200 MG capsule Take 1 capsule by mouth daily.       SUMAtriptan (IMITREX) 100 MG tablet Take 1 tablet by mouth at onset of headache. May repeat once in 24 hours if needed.       zolpidem (AMBIEN CR) 12.5 MG CR tablet Take 1 tablet by mouth nightly as needed.       amoxicillin (AMOXIL) 500 MG capsule Take 4 capsules by mouth 1 hour prior to dental appointment.       aspirin (ASA) 325 MG tablet Take 325 mg by mouth       OTC products: None, except as noted above  Allergies   Allergen Reactions     Morphine Hcl Nausea     Vicodin [Hydrocodone-Acetaminophen] Nausea      Social History     Tobacco Use     Smoking status: Never Smoker     Smokeless tobacco: Never Used   Substance Use Topics     Alcohol use: Yes     Comment: 1-2 drinks/month     History   Drug Use No       REVIEW OF SYSTEMS:                                                    CONSTITUTIONAL: NEGATIVE for fever, chills, change in weight  INTEGUMENTARY/SKIN: NEGATIVE for worrisome rashes, moles or lesions  EYES: NEGATIVE for vision changes or irritation  ENT/MOUTH: NEGATIVE for ear, mouth and throat problems  RESP: NEGATIVE for significant cough or SOB  BREAST: NEGATIVE for masses, tenderness or discharge  CV: NEGATIVE for chest pain, palpitations or peripheral edema  GI: NEGATIVE for nausea, abdominal pain, heartburn, or change in bowel habits  : NEGATIVE for frequency, dysuria, or hematuria  MUSCULOSKELETAL: NEGATIVE for significant arthralgias or myalgia  NEURO: NEGATIVE for weakness, dizziness or paresthesias  ENDOCRINE: NEGATIVE for temperature intolerance, skin/hair changes  HEME: NEGATIVE for bleeding problems  PSYCHIATRIC: NEGATIVE for changes in mood or affect    EXAM:                                                     BP 92/62   Pulse 88   Wt (P) 69.1 kg (152 lb 6.4 oz)   SpO2 98%   BMI (P) 25.36 kg/m       GENERAL APPEARANCE: healthy, alert and no distress     EYES: EOMI, PERRL     HENT: ear canals and TM's normal and nose and mouth without ulcers or lesions     NECK: no adenopathy, no asymmetry, masses, or scars and thyroid normal to palpation     RESP: lungs clear to auscultation - no rales, rhonchi or wheezes     CV: regular rates and rhythm, normal S1 S2, no S3 or S4 and no murmur, click or rub     ABDOMEN:  soft, nontender, no HSM or masses and bowel sounds normal     MS: extremities normal- no gross deformities noted, no evidence of inflammation in joints, FROM in all extremities.     SKIN: no suspicious lesions or rashes     NEURO: Normal strength and tone, sensory exam grossly normal, mentation intact and speech normal     PSYCH: mentation appears normal. and affect normal/bright     LYMPHATICS: No cervical adenopathy    DIAGNOSTICS:                                                      EKG: appears normal, NSR, normal axis, normal intervals, no acute ST/T changes c/w ischemia, no LVH by voltage criteria, unchanged from previous tracings  Chest XRay  Labs Resulted Today:   Results for orders placed or performed in visit on 07/16/19   Creatinine   Result Value Ref Range    Creatinine 0.78 0.52 - 1.04 mg/dL    GFR Estimate 80 >60 mL/min/[1.73_m2]    GFR Estimate If Black >90 >60 mL/min/[1.73_m2]   Hemoglobin   Result Value Ref Range    Hemoglobin 13.6 11.7 - 15.7 g/dL   UA reflex to Microscopic   Result Value Ref Range    Color Urine Yellow     Appearance Urine Clear     Glucose Urine Negative NEG^Negative mg/dL    Bilirubin Urine Negative NEG^Negative    Ketones Urine Negative NEG^Negative mg/dL    Specific Gravity Urine 1.019 1.003 - 1.035    Blood Urine Small (A) NEG^Negative    pH Urine 5.0 5.0 - 7.0 pH    Protein Albumin Urine Negative NEG^Negative mg/dL    Urobilinogen mg/dL 0.0 0.0 - 2.0 mg/dL    Nitrite Urine  Negative NEG^Negative    Leukocyte Esterase Urine Negative NEG^Negative    Source Midstream Urine     RBC Urine 1 0 - 2 /HPF    WBC Urine <1 0 - 5 /HPF    Squamous Epithelial /HPF Urine <1 0 - 1 /HPF    Mucous Urine Present (A) NEG^Negative /LPF   ABO/Rh type and screen   Result Value Ref Range    ABO A     RH(D) Pos     Antibody Screen Neg     Test Valid Only At          [facility]    Specimen Expires 07/19/2019      Labs Drawn and in Process:   Unresulted Labs Ordered in the Past 30 Days of this Admission     Date and Time Order Name Status Description    7/16/2019 1100 CMV ANTIBODY IGG In process     7/16/2019 1100 HLA FINAL CROSSMATCH DONOR In process         Recent Labs   Lab Test 07/16/19  1217 02/08/19  0720   HGB 13.6 13.5   PLT  --  177   INR  --  1.03   NA  --  142   POTASSIUM  --  3.6   CR 0.78 0.86   A1C  --  5.2      Assessment:                                                    Healthy voluntary kidney donor    There have been no significant intercurrent medical problems or change of intent in proceeding with kidney donation as scheduled on 7/26/2019.    1. Labs reviewed and within normal limits: Yes  2. EKG (2/8/2019): appears normal, NSR  3. Paired Exchange case: Yes  4. ABO= A  5. Laterality: left  6. Outstanding issues: Final ABO and cross match     Plan:                                                      1. Consent: Done  2. Outstanding issues: Final ABO and cross match     Signed Electronically by: Elle Brasher    Ms. Villasenor was seen and evaluated today as part of a shared APRN/PA visit.     I personally reviewed past medical and surgical history, vital signs, medications and labs, present and past medical history,and significant physical exam findings with the advanced practice provider.    My key findings include:  Ok to proceed with donor nephrectomy (left).  No medical changes from evaluation  Key management decisions made by me and  carried out under my direction include:  Proceed.    Tahir Zaldivar M.D.      Again, thank you for allowing me to participate in the care of your patient.      Sincerely,    Tahir Zaldivar MD

## 2019-07-16 NOTE — NURSING NOTE
Saw donor in clinic for her Pre-Op visit.  I reviewed procedure for the surgery day July 24.  She will be donating to a [facility] recipient Anonymously in a NKR Swap 1705.  She is a bridge donor as her intended recipient has already received a kidney from a NKR donor on July 2.    She will be bringing her eye drops she needs for glaucoma to the hospital.  She stated she is prone to constipation, I reviewed that it would be recommended for her to take Miralax on day -1.    I reviewed pain control medication that is typically prescribed including nerve block which Anesthesia will place preop.  I reviewed the ERAS protocol teaching sheet:  Encourage Activity.  Demonstrated and reviewed importance of Incentive Spirometry usage- I gave the patient an IS to bring home to practice and to bring to surgery.   I gave the patient two bottles of Ensure clear with instructions to drink at bedtime the night prior to surgery and again the morning of surgery when the patient awakens.  I reviewed instruction for no solid foods after 10 pm the night before surgery.  I reviewed NPO status including clear liquids begins at 05:30 on the morning of surgery.  I reviewed arrival time of 05:30 to  and Start time of surgery 07:30 on July 24.    Pt will meet with Surgeon and Nurse Practitioner today to develop plan for surgery.  I gave the patient a donor blanket and mug.   I thanked the patient for all that she is doing to make this happen.  I reviewed that the donor's bills will be paid for by insurance of the matched recipient.    I reviewed that the patient has a right to withdraw from kidney donation at any time, for any reason.   I reviewed post op plan of care including timing for office visit with surgeon and when labs are needed.  The patient will go home after discharge and then return to [facility] for her 2 week visit.   We reviewed plan for blood draws post donation.   I reviewed both the donor and recipient ABO blood  types, UNOS ID and placed source documents in folder for the day of surgery ABO verification by the surgical team.    Final XM results pending.  Serologies drawn today and sent to [facility] lab.

## 2019-07-16 NOTE — PROGRESS NOTES
Transplant Surgery H&P                                                        HPI:                                                      Ms. Villasenor is a 64 year old female who comes to clinic today for preop prior to planned laparoscopic kidney donation surgery. The patient was previously reviewed by the living donor multidisciplinary selection committee and found to be medically and psychosocially appropriate for voluntary kidney donation. The patient denies any feelings of being pressured to proceed with kidney donation.  Health events since donor evaluation: None    Special considerations:   Constipation: yes  PONV: no  History of Urinary retention? no  Significant neck/back/joint concerns for lateral decubitus positioning?: No  Adventism: No    MEDICAL HISTORY:                                                      Patient Active Problem List    Diagnosis Date Noted     Transplant donor evaluation 01/23/2019     Priority: Medium     CARDIOVASCULAR SCREENING; LDL GOAL LESS THAN 160 10/08/2012     Priority: Medium      Past Medical History:   Diagnosis Date     Cataract      Fibromyalgia      Glaucoma      Insomnia      Insomnia      Osteoarthritis      Osteoporosis      Osteoporosis      Past Surgical History:   Procedure Laterality Date     AS TOTAL KNEE ARTHROPLASTY       BUNIONECTOMY       HYSTERECTOMY       TUBAL/ECTOPIC PREGNANCY       Current Outpatient Medications   Medication Sig Dispense Refill     acetaminophen (ACETAMIN) 500 MG tablet Take 1-2 tablets by mouth every 6 hours as needed.       amitriptyline (ELAVIL) 25 MG tablet        Calcium Carbonate-Vitamin D (CALCIUM 600 + D OR) Take 1 tablet by mouth daily.       docusate sodium (COLACE) 100 MG capsule Take 1-2 capsules by mouth daily as needed.       methocarbamol (ROBAXIN) 500 MG tablet TAKE ONE TABLET BY MOUTH TWICE DAILY AS NEEDED       omega-3 fatty acids (FISH OIL) 1200 MG capsule Take 1 capsule by mouth daily.       SUMAtriptan  (IMITREX) 100 MG tablet Take 1 tablet by mouth at onset of headache. May repeat once in 24 hours if needed.       zolpidem (AMBIEN CR) 12.5 MG CR tablet Take 1 tablet by mouth nightly as needed.       amoxicillin (AMOXIL) 500 MG capsule Take 4 capsules by mouth 1 hour prior to dental appointment.       aspirin (ASA) 325 MG tablet Take 325 mg by mouth       OTC products: None, except as noted above  Allergies   Allergen Reactions     Morphine Hcl Nausea     Vicodin [Hydrocodone-Acetaminophen] Nausea      Social History     Tobacco Use     Smoking status: Never Smoker     Smokeless tobacco: Never Used   Substance Use Topics     Alcohol use: Yes     Comment: 1-2 drinks/month     History   Drug Use No       REVIEW OF SYSTEMS:                                                    CONSTITUTIONAL: NEGATIVE for fever, chills, change in weight  INTEGUMENTARY/SKIN: NEGATIVE for worrisome rashes, moles or lesions  EYES: NEGATIVE for vision changes or irritation  ENT/MOUTH: NEGATIVE for ear, mouth and throat problems  RESP: NEGATIVE for significant cough or SOB  BREAST: NEGATIVE for masses, tenderness or discharge  CV: NEGATIVE for chest pain, palpitations or peripheral edema  GI: NEGATIVE for nausea, abdominal pain, heartburn, or change in bowel habits  : NEGATIVE for frequency, dysuria, or hematuria  MUSCULOSKELETAL: NEGATIVE for significant arthralgias or myalgia  NEURO: NEGATIVE for weakness, dizziness or paresthesias  ENDOCRINE: NEGATIVE for temperature intolerance, skin/hair changes  HEME: NEGATIVE for bleeding problems  PSYCHIATRIC: NEGATIVE for changes in mood or affect    EXAM:                                                    BP 92/62   Pulse 88   Wt (P) 69.1 kg (152 lb 6.4 oz)   SpO2 98%   BMI (P) 25.36 kg/m      GENERAL APPEARANCE: healthy, alert and no distress     EYES: EOMI, PERRL     HENT: ear canals and TM's normal and nose and mouth without ulcers or lesions     NECK: no adenopathy, no asymmetry, masses,  or scars and thyroid normal to palpation     RESP: lungs clear to auscultation - no rales, rhonchi or wheezes     CV: regular rates and rhythm, normal S1 S2, no S3 or S4 and no murmur, click or rub     ABDOMEN:  soft, nontender, no HSM or masses and bowel sounds normal     MS: extremities normal- no gross deformities noted, no evidence of inflammation in joints, FROM in all extremities.     SKIN: no suspicious lesions or rashes     NEURO: Normal strength and tone, sensory exam grossly normal, mentation intact and speech normal     PSYCH: mentation appears normal. and affect normal/bright     LYMPHATICS: No cervical adenopathy    DIAGNOSTICS:                                                      EKG: appears normal, NSR, normal axis, normal intervals, no acute ST/T changes c/w ischemia, no LVH by voltage criteria, unchanged from previous tracings  Chest XRay  Labs Resulted Today:   Results for orders placed or performed in visit on 07/16/19   Creatinine   Result Value Ref Range    Creatinine 0.78 0.52 - 1.04 mg/dL    GFR Estimate 80 >60 mL/min/[1.73_m2]    GFR Estimate If Black >90 >60 mL/min/[1.73_m2]   Hemoglobin   Result Value Ref Range    Hemoglobin 13.6 11.7 - 15.7 g/dL   UA reflex to Microscopic   Result Value Ref Range    Color Urine Yellow     Appearance Urine Clear     Glucose Urine Negative NEG^Negative mg/dL    Bilirubin Urine Negative NEG^Negative    Ketones Urine Negative NEG^Negative mg/dL    Specific Gravity Urine 1.019 1.003 - 1.035    Blood Urine Small (A) NEG^Negative    pH Urine 5.0 5.0 - 7.0 pH    Protein Albumin Urine Negative NEG^Negative mg/dL    Urobilinogen mg/dL 0.0 0.0 - 2.0 mg/dL    Nitrite Urine Negative NEG^Negative    Leukocyte Esterase Urine Negative NEG^Negative    Source Midstream Urine     RBC Urine 1 0 - 2 /HPF    WBC Urine <1 0 - 5 /HPF    Squamous Epithelial /HPF Urine <1 0 - 1 /HPF    Mucous Urine Present (A) NEG^Negative /LPF   ABO/Rh type and screen   Result Value Ref Range     ABO A     RH(D) Pos     Antibody Screen Neg     Test Valid Only At          [facility]    Specimen Expires 07/19/2019      Labs Drawn and in Process:   Unresulted Labs Ordered in the Past 30 Days of this Admission     Date and Time Order Name Status Description    7/16/2019 1100 CMV ANTIBODY IGG In process     7/16/2019 1100 HLA FINAL CROSSMATCH DONOR In process         Recent Labs   Lab Test 07/16/19  1217 02/08/19  0720   HGB 13.6 13.5   PLT  --  177   INR  --  1.03   NA  --  142   POTASSIUM  --  3.6   CR 0.78 0.86   A1C  --  5.2      Assessment:                                                    Healthy voluntary kidney donor    There have been no significant intercurrent medical problems or change of intent in proceeding with kidney donation as scheduled on 7/26/2019.    1. Labs reviewed and within normal limits: Yes  2. EKG (2/8/2019): appears normal, NSR  3. Paired Exchange case: Yes  4. ABO= A  5. Laterality: left  6. Outstanding issues: Final ABO and cross match     Plan:                                                      1. Consent: Done  2. Outstanding issues: Final ABO and cross match     Signed Electronically by: Elle Brasher    Ms. Villasenor was seen and evaluated today as part of a shared APRN/PA visit.     I personally reviewed past medical and surgical history, vital signs, medications and labs, present and past medical history,and significant physical exam findings with the advanced practice provider.    My key findings include:  Ok to proceed with donor nephrectomy (left).  No medical changes from evaluation  Key management decisions made by me and carried out under my direction include:  Proceed.    Tahir Zaldivar M.D.

## 2019-07-16 NOTE — NURSING NOTE
Chief Complaint   Patient presents with     Pre-Op Exam     Donor pre op DM-8     Blood pressure 92/62, pulse 88, weight (P) 69.1 kg (152 lb 6.4 oz), SpO2 98 %.    Vicky Carpio, CMA

## 2019-07-17 LAB
CMV IGG SERPL QL IA: <0.2 AI (ref 0–0.8)
HLA FINAL CROSSMATCH DONOR: NORMAL

## 2019-07-18 ENCOUNTER — TRANSFERRED RECORDS (OUTPATIENT)
Dept: HEALTH INFORMATION MANAGEMENT | Facility: CLINIC | Age: 64
End: 2019-07-18

## 2019-07-19 ENCOUNTER — TELEPHONE (OUTPATIENT)
Dept: TRANSPLANT | Facility: CLINIC | Age: 64
End: 2019-07-19

## 2019-07-19 NOTE — TELEPHONE ENCOUNTER
Called pt per request of Ivone Higuera, as pt reports having questions about protein post donation. Pt reports hearing that she should not take protein shake or drinks, such as Boost, Ensure, or smoothies made with protein powders post op. We discussed her daily recommended amount of protein intake for maintenance is 50-65 g/day, yet would not exceed much over 70 g/day consistently d/t potential stress on the sole kidney. We reviewed visuals for portion size and how much protein a deck of cards (3 oz) of meat will have, ~25 g. She can certainly do protein bars or shakes, as long as she is careful with amount and overall daily protein intake. We discussed increased protein needs post op for healing purposes and that if she is tracking and falling short, she can certainly add in a supplement. We reviewed that looking at what kind of protein supplements is also important. For example, a protein powder with a long list of ingredients may be more likely to contain herbal or natural constituents that is not recommended post donation, whereas a 5-ingredient protein powder should be just fine. Ensure/Boost are also ok.     Emailed pt list of protein sources and protein content of foods. Pt to call back with any further questions.

## 2019-07-22 RX ORDER — ASPIRIN 81 MG/1
81 TABLET ORAL DAILY
COMMUNITY

## 2019-07-23 ENCOUNTER — ANESTHESIA EVENT (OUTPATIENT)
Dept: SURGERY | Facility: CLINIC | Age: 64
DRG: 661 | End: 2019-07-23

## 2019-07-23 NOTE — ANESTHESIA PREPROCEDURE EVALUATION
Anesthesia Pre-Procedure Evaluation    Patient: Jacque Villasenor   MRN:     [id number] Gender:   female   Age:    64 year old :      1955        Preoperative Diagnosis: Donor   Procedure(s):  Laparoscopic Hand Assisted Living Non Directed Kidney Donor     Past Medical History:   Diagnosis Date     Cataract      Fibromyalgia      Glaucoma      Insomnia      Insomnia      Osteoarthritis      Osteoporosis      Osteoporosis       Past Surgical History:   Procedure Laterality Date     AS TOTAL KNEE ARTHROPLASTY       BUNIONECTOMY       HYSTERECTOMY       TUBAL/ECTOPIC PREGNANCY            Anesthesia Evaluation     . Pt has had prior anesthetic. Type: MAC    No history of anesthetic complications          ROS/MED HX    ENT/Pulmonary:  - neg pulmonary ROS     Neurologic:     (+)migraines,    (-) CVA   Cardiovascular:     (+) ----. : . . . :. . Previous cardiac testing date:results:date: results:ECG reviewed date:19 results:NSR date: results:         (-) hypertension, CAD and CHF   METS/Exercise Tolerance:  >4 METS   Hematologic:  - neg hematologic  ROS       Musculoskeletal:   (+) arthritis,  -       GI/Hepatic:  - neg GI/hepatic ROS      (-) GERD   Renal/Genitourinary:  - ROS Renal section negative    (-) renal disease   Endo:      (-) Type II DM, thyroid disease and obesity   Psychiatric:  - neg psychiatric ROS       Infectious Disease:  - neg infectious disease ROS       Malignancy:      - no malignancy   Other: Comment: Hx of Fibromyalgia                          PHYSICAL EXAM:   Mental Status/Neuro: A/A/O   Airway: Facies: Feasible  Mallampati: II  Mouth/Opening: Full  TM distance: > 6 cm  Neck ROM: Full   Respiratory: Auscultation: CTAB     Resp. Rate: Normal     Resp. Effort: Normal      CV: Rhythm: Regular  Rate: Age appropriate  Heart: Normal Sounds   Comments:      Dental: Normal                  Lab Results   Component Value Date    WBC 4.4 2019    HGB 13.6 2019    HCT 42.0 2019     " 02/08/2019     02/08/2019    POTASSIUM 3.6 02/08/2019    CHLORIDE 108 02/08/2019    CO2 28 02/08/2019    BUN 16 02/08/2019    CR 0.78 07/16/2019    GLC 94 02/08/2019    STEFANO 8.2 (L) 02/08/2019    PHOS 2.8 02/08/2019    ALBUMIN 3.8 02/08/2019    PROTTOTAL 6.8 02/08/2019    ALT 20 02/08/2019    AST 16 02/08/2019    ALKPHOS 49 02/08/2019    BILITOTAL 0.5 02/08/2019    PTT 26 02/08/2019    INR 1.03 02/08/2019       Preop Vitals  BP Readings from Last 3 Encounters:   07/16/19 92/62   02/08/19 120/79   08/09/12 102/68    Pulse Readings from Last 3 Encounters:   07/16/19 88   02/08/19 88   08/09/12 76      Resp Readings from Last 3 Encounters:   No data found for Resp    SpO2 Readings from Last 3 Encounters:   07/16/19 98%   02/08/19 98%      Temp Readings from Last 1 Encounters:   No data found for Temp    Ht Readings from Last 1 Encounters:   02/08/19 1.651 m (5' 5\")      Wt Readings from Last 1 Encounters:   07/16/19 (P) 69.1 kg (152 lb 6.4 oz)    Estimated body mass index is 25.36 kg/m  (pended) as calculated from the following:    Height as of 2/8/19: 1.651 m (5' 5\").    Weight as of 7/16/19: (P) 69.1 kg (152 lb 6.4 oz).     LDA:            Assessment:   ASA SCORE: 2    NPO Status: > 6 hours since completed Solid Foods; > 2 hours since completed Clear Liquids   Documentation: H&P complete; Preop Testing complete; Consents complete   Proceeding: Proceed without further delay  Tobacco Use:  NO Active use of Tobacco/UNKNOWN Tobacco use status     Plan:   Anes. Type:  General; Regional     RA Details:  FOR POSTOP PAIN CONTROL; Pre Induction     RA-Location/Type: TAP; Plane Block   Pre-Induction: Midazolam IV; Acetaminophen PO; Gabapentin PO   Induction:  IV (Standard)   Airway: Oral ETT   Access/Monitoring: PIV; 2nd PIV   Maintenance: Balanced   Emergence: Recovery Site (PACU/ICU)   Logistics: Observation/Admission     Postop Pain/Sedation Strategy:  Standard-Options: Opioids PRN; Block SS     PONV " Management:  Adult Risk Factors: Female, Non-Smoker, Postop Opioids  Prevention: Dexamethasone; Ondansetron     CONSENT: Direct conversation   Plan and risks discussed with: Patient   Blood Products: Consented (ALL Blood Products)                         Gaston Das DO

## 2019-07-24 ENCOUNTER — HOSPITAL ENCOUNTER (INPATIENT)
Facility: CLINIC | Age: 64
LOS: 2 days | Discharge: HOME | DRG: 661 | End: 2019-07-26
Attending: TRANSPLANT SURGERY | Admitting: TRANSPLANT SURGERY

## 2019-07-24 ENCOUNTER — ANESTHESIA EVENT (OUTPATIENT)
Dept: MEDSURG UNIT | Facility: CLINIC | Age: 64
DRG: 661 | End: 2019-07-24

## 2019-07-24 ENCOUNTER — ANESTHESIA (OUTPATIENT)
Dept: SURGERY | Facility: CLINIC | Age: 64
DRG: 661 | End: 2019-07-24

## 2019-07-24 ENCOUNTER — ANESTHESIA (OUTPATIENT)
Dept: MEDSURG UNIT | Facility: CLINIC | Age: 64
DRG: 661 | End: 2019-07-24

## 2019-07-24 DIAGNOSIS — Z52.4 KIDNEY DONOR: Primary | ICD-10-CM

## 2019-07-24 DIAGNOSIS — Z52.4 ENCOUNTER FOR DONATION OF KIDNEY: ICD-10-CM

## 2019-07-24 LAB
ABO + RH BLD: NORMAL
ABO + RH BLD: NORMAL
BLD GP AB SCN SERPL QL: NORMAL
BLOOD BANK CMNT PATIENT-IMP: NORMAL
GLUCOSE BLDC GLUCOMTR-MCNC: 89 MG/DL (ref 70–99)
HCT VFR BLD AUTO: 38.4 % (ref 35–47)
HGB BLD-MCNC: 12.4 G/DL (ref 11.7–15.7)
SPECIMEN EXP DATE BLD: NORMAL

## 2019-07-24 PROCEDURE — 25800030 ZZH RX IP 258 OP 636: Performed by: STUDENT IN AN ORGANIZED HEALTH CARE EDUCATION/TRAINING PROGRAM

## 2019-07-24 PROCEDURE — C9290 INJ, BUPIVACAINE LIPOSOME: HCPCS | Performed by: ANESTHESIOLOGY

## 2019-07-24 PROCEDURE — 25000132 ZZH RX MED GY IP 250 OP 250 PS 637: Performed by: NURSE PRACTITIONER

## 2019-07-24 PROCEDURE — 25000132 ZZH RX MED GY IP 250 OP 250 PS 637: Performed by: PHYSICIAN ASSISTANT

## 2019-07-24 PROCEDURE — 25000125 ZZHC RX 250: Performed by: TRANSPLANT SURGERY

## 2019-07-24 PROCEDURE — 25000125 ZZHC RX 250: Performed by: STUDENT IN AN ORGANIZED HEALTH CARE EDUCATION/TRAINING PROGRAM

## 2019-07-24 PROCEDURE — 36415 COLL VENOUS BLD VENIPUNCTURE: CPT | Performed by: STUDENT IN AN ORGANIZED HEALTH CARE EDUCATION/TRAINING PROGRAM

## 2019-07-24 PROCEDURE — 0TT70ZZ RESECTION OF LEFT URETER, OPEN APPROACH: ICD-10-PCS | Performed by: TRANSPLANT SURGERY

## 2019-07-24 PROCEDURE — 86900 BLOOD TYPING SEROLOGIC ABO: CPT | Performed by: STUDENT IN AN ORGANIZED HEALTH CARE EDUCATION/TRAINING PROGRAM

## 2019-07-24 PROCEDURE — 27210794 ZZH OR GENERAL SUPPLY STERILE: Performed by: TRANSPLANT SURGERY

## 2019-07-24 PROCEDURE — 37000008 ZZH ANESTHESIA TECHNICAL FEE, 1ST 30 MIN: Performed by: TRANSPLANT SURGERY

## 2019-07-24 PROCEDURE — 37000009 ZZH ANESTHESIA TECHNICAL FEE, EACH ADDTL 15 MIN: Performed by: TRANSPLANT SURGERY

## 2019-07-24 PROCEDURE — 25000132 ZZH RX MED GY IP 250 OP 250 PS 637: Performed by: SURGERY

## 2019-07-24 PROCEDURE — 71000015 ZZH RECOVERY PHASE 1 LEVEL 2 EA ADDTL HR: Performed by: TRANSPLANT SURGERY

## 2019-07-24 PROCEDURE — 25000128 H RX IP 250 OP 636: Performed by: STUDENT IN AN ORGANIZED HEALTH CARE EDUCATION/TRAINING PROGRAM

## 2019-07-24 PROCEDURE — 71000014 ZZH RECOVERY PHASE 1 LEVEL 2 FIRST HR: Performed by: TRANSPLANT SURGERY

## 2019-07-24 PROCEDURE — 85018 HEMOGLOBIN: CPT | Performed by: SURGERY

## 2019-07-24 PROCEDURE — 25000128 H RX IP 250 OP 636: Performed by: ANESTHESIOLOGY

## 2019-07-24 PROCEDURE — 0TT10ZZ RESECTION OF LEFT KIDNEY, OPEN APPROACH: ICD-10-PCS | Performed by: TRANSPLANT SURGERY

## 2019-07-24 PROCEDURE — 36415 COLL VENOUS BLD VENIPUNCTURE: CPT | Performed by: TRANSPLANT SURGERY

## 2019-07-24 PROCEDURE — 36000062 ZZH SURGERY LEVEL 4 1ST 30 MIN - UMMC: Performed by: TRANSPLANT SURGERY

## 2019-07-24 PROCEDURE — 25000128 H RX IP 250 OP 636: Performed by: NURSE PRACTITIONER

## 2019-07-24 PROCEDURE — 86901 BLOOD TYPING SEROLOGIC RH(D): CPT | Performed by: STUDENT IN AN ORGANIZED HEALTH CARE EDUCATION/TRAINING PROGRAM

## 2019-07-24 PROCEDURE — 25000128 H RX IP 250 OP 636: Performed by: PHYSICIAN ASSISTANT

## 2019-07-24 PROCEDURE — 12000026 ZZH R&B TRANSPLANT

## 2019-07-24 PROCEDURE — 00000146 ZZHCL STATISTIC GLUCOSE BY METER IP

## 2019-07-24 PROCEDURE — 36000064 ZZH SURGERY LEVEL 4 EA 15 ADDTL MIN - UMMC: Performed by: TRANSPLANT SURGERY

## 2019-07-24 PROCEDURE — 85014 HEMATOCRIT: CPT | Performed by: TRANSPLANT SURGERY

## 2019-07-24 PROCEDURE — 40000171 ZZH STATISTIC PRE-PROCEDURE ASSESSMENT III: Performed by: TRANSPLANT SURGERY

## 2019-07-24 PROCEDURE — 25000566 ZZH SEVOFLURANE, EA 15 MIN: Performed by: TRANSPLANT SURGERY

## 2019-07-24 PROCEDURE — 25000128 H RX IP 250 OP 636: Performed by: SURGERY

## 2019-07-24 PROCEDURE — 85018 HEMOGLOBIN: CPT | Performed by: TRANSPLANT SURGERY

## 2019-07-24 PROCEDURE — 25000125 ZZHC RX 250: Performed by: NURSE PRACTITIONER

## 2019-07-24 PROCEDURE — C9290 INJ, BUPIVACAINE LIPOSOME: HCPCS | Performed by: STUDENT IN AN ORGANIZED HEALTH CARE EDUCATION/TRAINING PROGRAM

## 2019-07-24 PROCEDURE — 25000125 ZZHC RX 250: Performed by: PHYSICIAN ASSISTANT

## 2019-07-24 PROCEDURE — 86850 RBC ANTIBODY SCREEN: CPT | Performed by: STUDENT IN AN ORGANIZED HEALTH CARE EDUCATION/TRAINING PROGRAM

## 2019-07-24 RX ORDER — OXYCODONE HYDROCHLORIDE 5 MG/1
5-10 TABLET ORAL
Status: DISCONTINUED | OUTPATIENT
Start: 2019-07-24 | End: 2019-07-26 | Stop reason: HOSPADM

## 2019-07-24 RX ORDER — SODIUM CHLORIDE, SODIUM LACTATE, POTASSIUM CHLORIDE, CALCIUM CHLORIDE 600; 310; 30; 20 MG/100ML; MG/100ML; MG/100ML; MG/100ML
INJECTION, SOLUTION INTRAVENOUS CONTINUOUS PRN
Status: DISCONTINUED | OUTPATIENT
Start: 2019-07-24 | End: 2019-07-24

## 2019-07-24 RX ORDER — DEXTROSE, SODIUM CHLORIDE, SODIUM LACTATE, POTASSIUM CHLORIDE, AND CALCIUM CHLORIDE 5; .6; .31; .03; .02 G/100ML; G/100ML; G/100ML; G/100ML; G/100ML
INJECTION, SOLUTION INTRAVENOUS CONTINUOUS
Status: DISCONTINUED | OUTPATIENT
Start: 2019-07-24 | End: 2019-07-25

## 2019-07-24 RX ORDER — METOCLOPRAMIDE 5 MG/1
10 TABLET ORAL EVERY 6 HOURS PRN
Status: DISCONTINUED | OUTPATIENT
Start: 2019-07-24 | End: 2019-07-26 | Stop reason: HOSPADM

## 2019-07-24 RX ORDER — BRIMONIDINE TARTRATE AND TIMOLOL MALEATE 2; 5 MG/ML; MG/ML
1 SOLUTION OPHTHALMIC 2 TIMES DAILY
COMMUNITY

## 2019-07-24 RX ORDER — ONDANSETRON 2 MG/ML
4 INJECTION INTRAMUSCULAR; INTRAVENOUS EVERY 30 MIN PRN
Status: DISCONTINUED | OUTPATIENT
Start: 2019-07-24 | End: 2019-07-24 | Stop reason: HOSPADM

## 2019-07-24 RX ORDER — CARDIOPLEG/ORGAN PRESERV NO.1 9-198-2-1
BOTTLE PERFUSION PRN
Status: DISCONTINUED | OUTPATIENT
Start: 2019-07-24 | End: 2019-07-24 | Stop reason: HOSPADM

## 2019-07-24 RX ORDER — PROCHLORPERAZINE MALEATE 5 MG
10 TABLET ORAL EVERY 6 HOURS PRN
Status: DISCONTINUED | OUTPATIENT
Start: 2019-07-24 | End: 2019-07-26 | Stop reason: HOSPADM

## 2019-07-24 RX ORDER — ACETAMINOPHEN 325 MG/1
975 TABLET ORAL EVERY 8 HOURS
Status: DISCONTINUED | OUTPATIENT
Start: 2019-07-24 | End: 2019-07-26 | Stop reason: HOSPADM

## 2019-07-24 RX ORDER — ONDANSETRON 4 MG/1
4 TABLET, ORALLY DISINTEGRATING ORAL EVERY 30 MIN PRN
Status: DISCONTINUED | OUTPATIENT
Start: 2019-07-24 | End: 2019-07-24 | Stop reason: HOSPADM

## 2019-07-24 RX ORDER — FUROSEMIDE 10 MG/ML
INJECTION INTRAMUSCULAR; INTRAVENOUS PRN
Status: DISCONTINUED | OUTPATIENT
Start: 2019-07-24 | End: 2019-07-24

## 2019-07-24 RX ORDER — PROPOFOL 10 MG/ML
INJECTION, EMULSION INTRAVENOUS PRN
Status: DISCONTINUED | OUTPATIENT
Start: 2019-07-24 | End: 2019-07-24

## 2019-07-24 RX ORDER — LIDOCAINE HYDROCHLORIDE 20 MG/ML
INJECTION, SOLUTION INFILTRATION; PERINEURAL PRN
Status: DISCONTINUED | OUTPATIENT
Start: 2019-07-24 | End: 2019-07-24

## 2019-07-24 RX ORDER — FENTANYL CITRATE 50 UG/ML
20 INJECTION, SOLUTION INTRAMUSCULAR; INTRAVENOUS
Status: DISCONTINUED | OUTPATIENT
Start: 2019-07-24 | End: 2019-07-25

## 2019-07-24 RX ORDER — KETOROLAC TROMETHAMINE 30 MG/ML
15 INJECTION, SOLUTION INTRAMUSCULAR; INTRAVENOUS ONCE
Status: COMPLETED | OUTPATIENT
Start: 2019-07-24 | End: 2019-07-24

## 2019-07-24 RX ORDER — CEFUROXIME SODIUM 1.5 G/16ML
1.5 INJECTION, POWDER, FOR SOLUTION INTRAVENOUS
Status: DISCONTINUED | OUTPATIENT
Start: 2019-07-24 | End: 2019-07-24 | Stop reason: HOSPADM

## 2019-07-24 RX ORDER — BRIMONIDINE TARTRATE AND TIMOLOL MALEATE 2; 5 MG/ML; MG/ML
1 SOLUTION OPHTHALMIC 2 TIMES DAILY
Status: DISCONTINUED | OUTPATIENT
Start: 2019-07-24 | End: 2019-07-26 | Stop reason: HOSPADM

## 2019-07-24 RX ORDER — CEFUROXIME SODIUM 1.5 G/16ML
1.5 INJECTION, POWDER, FOR SOLUTION INTRAVENOUS
Status: COMPLETED | OUTPATIENT
Start: 2019-07-24 | End: 2019-07-24

## 2019-07-24 RX ORDER — ONDANSETRON 2 MG/ML
4 INJECTION INTRAMUSCULAR; INTRAVENOUS EVERY 6 HOURS PRN
Status: DISCONTINUED | OUTPATIENT
Start: 2019-07-24 | End: 2019-07-26 | Stop reason: HOSPADM

## 2019-07-24 RX ORDER — METOCLOPRAMIDE HYDROCHLORIDE 5 MG/ML
10 INJECTION INTRAMUSCULAR; INTRAVENOUS EVERY 6 HOURS PRN
Status: DISCONTINUED | OUTPATIENT
Start: 2019-07-24 | End: 2019-07-26 | Stop reason: HOSPADM

## 2019-07-24 RX ORDER — NALOXONE HYDROCHLORIDE 0.4 MG/ML
.1-.4 INJECTION, SOLUTION INTRAMUSCULAR; INTRAVENOUS; SUBCUTANEOUS
Status: DISCONTINUED | OUTPATIENT
Start: 2019-07-25 | End: 2019-07-26 | Stop reason: HOSPADM

## 2019-07-24 RX ORDER — NALOXONE HYDROCHLORIDE 0.4 MG/ML
.1-.4 INJECTION, SOLUTION INTRAMUSCULAR; INTRAVENOUS; SUBCUTANEOUS
Status: ACTIVE | OUTPATIENT
Start: 2019-07-24 | End: 2019-07-25

## 2019-07-24 RX ORDER — BUPIVACAINE HYDROCHLORIDE 2.5 MG/ML
INJECTION, SOLUTION EPIDURAL; INFILTRATION; INTRACAUDAL PRN
Status: DISCONTINUED | OUTPATIENT
Start: 2019-07-24 | End: 2019-07-24

## 2019-07-24 RX ORDER — FENTANYL CITRATE 50 UG/ML
INJECTION, SOLUTION INTRAMUSCULAR; INTRAVENOUS PRN
Status: DISCONTINUED | OUTPATIENT
Start: 2019-07-24 | End: 2019-07-24

## 2019-07-24 RX ORDER — FLUMAZENIL 0.1 MG/ML
0.2 INJECTION, SOLUTION INTRAVENOUS
Status: DISCONTINUED | OUTPATIENT
Start: 2019-07-24 | End: 2019-07-24 | Stop reason: HOSPADM

## 2019-07-24 RX ORDER — EPHEDRINE SULFATE 50 MG/ML
INJECTION, SOLUTION INTRAMUSCULAR; INTRAVENOUS; SUBCUTANEOUS PRN
Status: DISCONTINUED | OUTPATIENT
Start: 2019-07-24 | End: 2019-07-24

## 2019-07-24 RX ORDER — GLYCOPYRROLATE 0.2 MG/ML
INJECTION, SOLUTION INTRAMUSCULAR; INTRAVENOUS PRN
Status: DISCONTINUED | OUTPATIENT
Start: 2019-07-24 | End: 2019-07-24

## 2019-07-24 RX ORDER — MAGNESIUM SULFATE 1 G/100ML
INJECTION INTRAVENOUS PRN
Status: DISCONTINUED | OUTPATIENT
Start: 2019-07-24 | End: 2019-07-24

## 2019-07-24 RX ORDER — FENTANYL CITRATE 50 UG/ML
25-50 INJECTION, SOLUTION INTRAMUSCULAR; INTRAVENOUS
Status: DISCONTINUED | OUTPATIENT
Start: 2019-07-24 | End: 2019-07-24 | Stop reason: HOSPADM

## 2019-07-24 RX ORDER — ONDANSETRON 2 MG/ML
4 INJECTION INTRAMUSCULAR; INTRAVENOUS ONCE
Status: DISCONTINUED | OUTPATIENT
Start: 2019-07-24 | End: 2019-07-24 | Stop reason: HOSPADM

## 2019-07-24 RX ORDER — PROTAMINE SULFATE 10 MG/ML
50 INJECTION, SOLUTION INTRAVENOUS ONCE
Status: COMPLETED | OUTPATIENT
Start: 2019-07-24 | End: 2019-07-24

## 2019-07-24 RX ORDER — ACETAMINOPHEN 325 MG/1
975 TABLET ORAL ONCE
Status: COMPLETED | OUTPATIENT
Start: 2019-07-24 | End: 2019-07-24

## 2019-07-24 RX ORDER — ALBUMIN, HUMAN INJ 5% 5 %
250 SOLUTION INTRAVENOUS EVERY 10 MIN PRN
Status: DISCONTINUED | OUTPATIENT
Start: 2019-07-24 | End: 2019-07-24 | Stop reason: HOSPADM

## 2019-07-24 RX ORDER — FENTANYL CITRATE 50 UG/ML
10-20 INJECTION, SOLUTION INTRAMUSCULAR; INTRAVENOUS
Status: DISCONTINUED | OUTPATIENT
Start: 2019-07-24 | End: 2019-07-26 | Stop reason: HOSPADM

## 2019-07-24 RX ORDER — KETOROLAC TROMETHAMINE 15 MG/ML
15 INJECTION, SOLUTION INTRAMUSCULAR; INTRAVENOUS EVERY 8 HOURS
Status: DISPENSED | OUTPATIENT
Start: 2019-07-24 | End: 2019-07-26

## 2019-07-24 RX ORDER — ONDANSETRON 2 MG/ML
INJECTION INTRAMUSCULAR; INTRAVENOUS PRN
Status: DISCONTINUED | OUTPATIENT
Start: 2019-07-24 | End: 2019-07-24

## 2019-07-24 RX ORDER — NALOXONE HYDROCHLORIDE 0.4 MG/ML
.1-.4 INJECTION, SOLUTION INTRAMUSCULAR; INTRAVENOUS; SUBCUTANEOUS
Status: DISCONTINUED | OUTPATIENT
Start: 2019-07-24 | End: 2019-07-24 | Stop reason: HOSPADM

## 2019-07-24 RX ORDER — AMOXICILLIN 250 MG
1 CAPSULE ORAL 2 TIMES DAILY
Status: DISCONTINUED | OUTPATIENT
Start: 2019-07-24 | End: 2019-07-26 | Stop reason: HOSPADM

## 2019-07-24 RX ORDER — HYDROMORPHONE HYDROCHLORIDE 1 MG/ML
.3-.5 INJECTION, SOLUTION INTRAMUSCULAR; INTRAVENOUS; SUBCUTANEOUS EVERY 5 MIN PRN
Status: DISCONTINUED | OUTPATIENT
Start: 2019-07-24 | End: 2019-07-24 | Stop reason: HOSPADM

## 2019-07-24 RX ORDER — LABETALOL 20 MG/4 ML (5 MG/ML) INTRAVENOUS SYRINGE
10 EVERY 10 MIN PRN
Status: DISCONTINUED | OUTPATIENT
Start: 2019-07-24 | End: 2019-07-24 | Stop reason: HOSPADM

## 2019-07-24 RX ORDER — GABAPENTIN 300 MG/1
300 CAPSULE ORAL ONCE
Status: COMPLETED | OUTPATIENT
Start: 2019-07-24 | End: 2019-07-24

## 2019-07-24 RX ORDER — DEXAMETHASONE SODIUM PHOSPHATE 4 MG/ML
8 INJECTION, SOLUTION INTRA-ARTICULAR; INTRALESIONAL; INTRAMUSCULAR; INTRAVENOUS; SOFT TISSUE ONCE
Status: DISCONTINUED | OUTPATIENT
Start: 2019-07-24 | End: 2019-07-24 | Stop reason: HOSPADM

## 2019-07-24 RX ORDER — HEPARIN SODIUM 1000 [USP'U]/ML
70 INJECTION, SOLUTION INTRAVENOUS; SUBCUTANEOUS ONCE
Status: DISCONTINUED | OUTPATIENT
Start: 2019-07-24 | End: 2019-07-24 | Stop reason: HOSPADM

## 2019-07-24 RX ORDER — SUMATRIPTAN 100 MG/1
100 TABLET, FILM COATED ORAL DAILY PRN
Status: DISCONTINUED | OUTPATIENT
Start: 2019-07-24 | End: 2019-07-26 | Stop reason: HOSPADM

## 2019-07-24 RX ORDER — HEPARIN SODIUM 1000 [USP'U]/ML
INJECTION, SOLUTION INTRAVENOUS; SUBCUTANEOUS PRN
Status: DISCONTINUED | OUTPATIENT
Start: 2019-07-24 | End: 2019-07-24

## 2019-07-24 RX ORDER — ZOLPIDEM TARTRATE 6.25 MG/1
12.5 TABLET, FILM COATED, EXTENDED RELEASE ORAL
Status: DISCONTINUED | OUTPATIENT
Start: 2019-07-24 | End: 2019-07-26 | Stop reason: HOSPADM

## 2019-07-24 RX ORDER — ONDANSETRON 4 MG/1
4 TABLET, ORALLY DISINTEGRATING ORAL EVERY 6 HOURS PRN
Status: DISCONTINUED | OUTPATIENT
Start: 2019-07-24 | End: 2019-07-26 | Stop reason: HOSPADM

## 2019-07-24 RX ORDER — DEXAMETHASONE SODIUM PHOSPHATE 4 MG/ML
INJECTION, SOLUTION INTRA-ARTICULAR; INTRALESIONAL; INTRAMUSCULAR; INTRAVENOUS; SOFT TISSUE PRN
Status: DISCONTINUED | OUTPATIENT
Start: 2019-07-24 | End: 2019-07-24

## 2019-07-24 RX ORDER — AMOXICILLIN 250 MG
2 CAPSULE ORAL 2 TIMES DAILY
Status: DISCONTINUED | OUTPATIENT
Start: 2019-07-24 | End: 2019-07-26 | Stop reason: HOSPADM

## 2019-07-24 RX ORDER — METHOCARBAMOL 500 MG/1
500 TABLET, FILM COATED ORAL 4 TIMES DAILY
Status: DISCONTINUED | OUTPATIENT
Start: 2019-07-24 | End: 2019-07-26 | Stop reason: HOSPADM

## 2019-07-24 RX ORDER — MANNITOL 20 G/100ML
INJECTION, SOLUTION INTRAVENOUS PRN
Status: DISCONTINUED | OUTPATIENT
Start: 2019-07-24 | End: 2019-07-24

## 2019-07-24 RX ADMIN — FENTANYL CITRATE 50 MCG: 50 INJECTION INTRAMUSCULAR; INTRAVENOUS at 06:33

## 2019-07-24 RX ADMIN — ROCURONIUM BROMIDE 20 MG: 10 INJECTION INTRAVENOUS at 09:39

## 2019-07-24 RX ADMIN — ROCURONIUM BROMIDE 20 MG: 10 INJECTION INTRAVENOUS at 11:12

## 2019-07-24 RX ADMIN — METHOCARBAMOL 500 MG: 500 TABLET, FILM COATED ORAL at 21:46

## 2019-07-24 RX ADMIN — OXYCODONE HYDROCHLORIDE 5 MG: 5 TABLET ORAL at 22:14

## 2019-07-24 RX ADMIN — Medication 10 MG: at 09:19

## 2019-07-24 RX ADMIN — SENNOSIDES AND DOCUSATE SODIUM 2 TABLET: 8.6; 5 TABLET ORAL at 20:21

## 2019-07-24 RX ADMIN — AMITRIPTYLINE HYDROCHLORIDE 25 MG: 25 TABLET, FILM COATED ORAL at 21:46

## 2019-07-24 RX ADMIN — FENTANYL CITRATE 50 MCG: 50 INJECTION, SOLUTION INTRAMUSCULAR; INTRAVENOUS at 09:28

## 2019-07-24 RX ADMIN — ONDANSETRON 8 MG: 2 INJECTION INTRAMUSCULAR; INTRAVENOUS at 11:57

## 2019-07-24 RX ADMIN — SODIUM CHLORIDE, SODIUM LACTATE, POTASSIUM CHLORIDE, CALCIUM CHLORIDE AND DEXTROSE MONOHYDRATE: 5; 600; 310; 30; 20 INJECTION, SOLUTION INTRAVENOUS at 13:29

## 2019-07-24 RX ADMIN — MIDAZOLAM 1 MG: 1 INJECTION INTRAMUSCULAR; INTRAVENOUS at 06:33

## 2019-07-24 RX ADMIN — HEPARIN SODIUM 5000 UNITS: 1000 INJECTION, SOLUTION INTRAVENOUS; SUBCUTANEOUS at 11:33

## 2019-07-24 RX ADMIN — RANITIDINE 150 MG: 150 TABLET ORAL at 20:21

## 2019-07-24 RX ADMIN — PHENYLEPHRINE HYDROCHLORIDE 50 MCG: 10 INJECTION INTRAVENOUS at 08:52

## 2019-07-24 RX ADMIN — ROCURONIUM BROMIDE 5 MG: 10 INJECTION INTRAVENOUS at 11:59

## 2019-07-24 RX ADMIN — FENTANYL CITRATE 50 MCG: 50 INJECTION, SOLUTION INTRAMUSCULAR; INTRAVENOUS at 11:50

## 2019-07-24 RX ADMIN — SUGAMMADEX 200 MG: 100 INJECTION, SOLUTION INTRAVENOUS at 12:55

## 2019-07-24 RX ADMIN — CEFUROXIME 1.5 G: 1.5 INJECTION, POWDER, FOR SOLUTION INTRAVENOUS at 09:05

## 2019-07-24 RX ADMIN — ONDANSETRON 4 MG: 2 INJECTION INTRAMUSCULAR; INTRAVENOUS at 17:54

## 2019-07-24 RX ADMIN — Medication 2 G: at 09:05

## 2019-07-24 RX ADMIN — SODIUM CHLORIDE, POTASSIUM CHLORIDE, SODIUM LACTATE AND CALCIUM CHLORIDE: 600; 310; 30; 20 INJECTION, SOLUTION INTRAVENOUS at 08:37

## 2019-07-24 RX ADMIN — FENTANYL CITRATE 25 MCG: 50 INJECTION, SOLUTION INTRAMUSCULAR; INTRAVENOUS at 11:07

## 2019-07-24 RX ADMIN — DEXAMETHASONE SODIUM PHOSPHATE 8 MG: 4 INJECTION, SOLUTION INTRA-ARTICULAR; INTRALESIONAL; INTRAMUSCULAR; INTRAVENOUS; SOFT TISSUE at 08:42

## 2019-07-24 RX ADMIN — KETOROLAC TROMETHAMINE 15 MG: 15 INJECTION, SOLUTION INTRAMUSCULAR; INTRAVENOUS at 20:01

## 2019-07-24 RX ADMIN — GLYCOPYRROLATE 0.2 MG: 0.2 INJECTION, SOLUTION INTRAMUSCULAR; INTRAVENOUS at 09:22

## 2019-07-24 RX ADMIN — ACETAMINOPHEN 975 MG: 325 TABLET, FILM COATED ORAL at 05:47

## 2019-07-24 RX ADMIN — PHENYLEPHRINE HYDROCHLORIDE 50 MCG: 10 INJECTION INTRAVENOUS at 08:47

## 2019-07-24 RX ADMIN — ROCURONIUM BROMIDE 20 MG: 10 INJECTION INTRAVENOUS at 10:18

## 2019-07-24 RX ADMIN — CEFUROXIME 1.5 G: 1.5 INJECTION, POWDER, FOR SOLUTION INTRAVENOUS at 11:00

## 2019-07-24 RX ADMIN — BRIMONIDINE TARTRATE, TIMOLOL MALEATE 1 DROP: 2; 5 SOLUTION/ DROPS OPHTHALMIC at 18:38

## 2019-07-24 RX ADMIN — SODIUM CHLORIDE, POTASSIUM CHLORIDE, SODIUM LACTATE AND CALCIUM CHLORIDE: 600; 310; 30; 20 INJECTION, SOLUTION INTRAVENOUS at 09:05

## 2019-07-24 RX ADMIN — FENTANYL CITRATE 25 MCG: 50 INJECTION INTRAMUSCULAR; INTRAVENOUS at 13:32

## 2019-07-24 RX ADMIN — PROPOFOL 10 MG: 10 INJECTION, EMULSION INTRAVENOUS at 12:37

## 2019-07-24 RX ADMIN — PROTAMINE SULFATE 50 MG: 10 INJECTION, SOLUTION INTRAVENOUS at 11:42

## 2019-07-24 RX ADMIN — FENTANYL CITRATE 25 MCG: 50 INJECTION INTRAMUSCULAR; INTRAVENOUS at 13:43

## 2019-07-24 RX ADMIN — MANNITOL 12.5 G: 20 INJECTION, SOLUTION INTRAVENOUS at 11:30

## 2019-07-24 RX ADMIN — PROPOFOL 10 MG: 10 INJECTION, EMULSION INTRAVENOUS at 12:20

## 2019-07-24 RX ADMIN — HYDROMORPHONE HYDROCHLORIDE 0.5 MG: 1 INJECTION, SOLUTION INTRAMUSCULAR; INTRAVENOUS; SUBCUTANEOUS at 10:37

## 2019-07-24 RX ADMIN — FENTANYL CITRATE 25 MCG: 50 INJECTION, SOLUTION INTRAMUSCULAR; INTRAVENOUS at 10:41

## 2019-07-24 RX ADMIN — Medication 5 MG: at 09:17

## 2019-07-24 RX ADMIN — FENTANYL CITRATE 50 MCG: 50 INJECTION, SOLUTION INTRAMUSCULAR; INTRAVENOUS at 09:38

## 2019-07-24 RX ADMIN — FENTANYL CITRATE 50 MCG: 50 INJECTION INTRAMUSCULAR; INTRAVENOUS at 13:38

## 2019-07-24 RX ADMIN — KETOROLAC TROMETHAMINE 15 MG: 30 INJECTION, SOLUTION INTRAMUSCULAR at 12:02

## 2019-07-24 RX ADMIN — FENTANYL CITRATE 20 MCG: 50 INJECTION INTRAMUSCULAR; INTRAVENOUS at 18:01

## 2019-07-24 RX ADMIN — FENTANYL CITRATE 20 MCG: 50 INJECTION INTRAMUSCULAR; INTRAVENOUS at 16:07

## 2019-07-24 RX ADMIN — FENTANYL CITRATE 100 MCG: 50 INJECTION, SOLUTION INTRAMUSCULAR; INTRAVENOUS at 08:37

## 2019-07-24 RX ADMIN — BUPIVACAINE 20 ML: 13.3 INJECTION, SUSPENSION, LIPOSOMAL INFILTRATION at 06:40

## 2019-07-24 RX ADMIN — FUROSEMIDE 10 MG: 10 INJECTION, SOLUTION INTRAVENOUS at 11:30

## 2019-07-24 RX ADMIN — ROCURONIUM BROMIDE 60 MG: 10 INJECTION INTRAVENOUS at 08:38

## 2019-07-24 RX ADMIN — LIDOCAINE HYDROCHLORIDE 80 MG: 20 INJECTION, SOLUTION INFILTRATION; PERINEURAL at 08:37

## 2019-07-24 RX ADMIN — PROPOFOL 140 MG: 10 INJECTION, EMULSION INTRAVENOUS at 08:37

## 2019-07-24 RX ADMIN — PROCHLORPERAZINE EDISYLATE 10 MG: 5 INJECTION INTRAMUSCULAR; INTRAVENOUS at 20:21

## 2019-07-24 RX ADMIN — ACETAMINOPHEN 975 MG: 325 TABLET, FILM COATED ORAL at 21:46

## 2019-07-24 RX ADMIN — SODIUM CHLORIDE, SODIUM LACTATE, POTASSIUM CHLORIDE, CALCIUM CHLORIDE AND DEXTROSE MONOHYDRATE: 5; 600; 310; 30; 20 INJECTION, SOLUTION INTRAVENOUS at 17:04

## 2019-07-24 RX ADMIN — BUPIVACAINE HYDROCHLORIDE 20 ML: 2.5 INJECTION, SOLUTION EPIDURAL; INFILTRATION; INTRACAUDAL; PERINEURAL at 06:40

## 2019-07-24 RX ADMIN — FENTANYL CITRATE 50 MCG: 50 INJECTION, SOLUTION INTRAMUSCULAR; INTRAVENOUS at 10:21

## 2019-07-24 RX ADMIN — GABAPENTIN 300 MG: 300 CAPSULE ORAL at 05:48

## 2019-07-24 RX ADMIN — FENTANYL CITRATE 20 MCG: 50 INJECTION INTRAMUSCULAR; INTRAVENOUS at 21:46

## 2019-07-24 ASSESSMENT — ACTIVITIES OF DAILY LIVING (ADL)
ADLS_ACUITY_SCORE: 13
AMBULATION: 0-->INDEPENDENT
ADLS_ACUITY_SCORE: 13
RETIRED_EATING: 0-->INDEPENDENT
RETIRED_COMMUNICATION: 0-->UNDERSTANDS/COMMUNICATES WITHOUT DIFFICULTY
SWALLOWING: 0-->SWALLOWS FOODS/LIQUIDS WITHOUT DIFFICULTY
COGNITION: 0 - NO COGNITION ISSUES REPORTED
TOILETING: 0-->INDEPENDENT
DRESS: 0-->INDEPENDENT
TRANSFERRING: 0-->INDEPENDENT
FALL_HISTORY_WITHIN_LAST_SIX_MONTHS: NO
BATHING: 0-->INDEPENDENT

## 2019-07-24 ASSESSMENT — MIFFLIN-ST. JEOR: SCORE: 1251.88

## 2019-07-24 ASSESSMENT — PAIN DESCRIPTION - DESCRIPTORS: DESCRIPTORS: SORE

## 2019-07-24 NOTE — ANESTHESIA CARE TRANSFER NOTE
Patient: Jacque Villasenor    Procedure(s):  Laparoscopic Hand Assisted Living Non Directed Kidney Donor    Diagnosis: Donor  Diagnosis Additional Information: No value filed.    Anesthesia Type:   No value filed.     Note:  Airway :Face Mask  Patient transferred to:PACU  Comments: Patient is Awake, VSS, following commands. Patient is breathing Spontaneously with face mask . No obvious complications from anesthesia. Care report given to PACU RN, and notified of assigned Anesthesiology staff to patient for continuity of PACU care.     Gaston Das DO.  Anesthesiology Resident CA-1, PGY-2  Pager [phone]  Handoff Report: Identifed the Patient, Identified the Reponsible Provider, Reviewed the pertinent medical history, Discussed the surgical course, Reviewed Intra-OP anesthesia mangement and issues during anesthesia, Set expectations for post-procedure period and Allowed opportunity for questions and acknowledgement of understanding      Vitals: (Last set prior to Anesthesia Care Transfer)    CRNA VITALS  7/24/2019 1235 - 7/24/2019 1317      7/24/2019             EKG:  Sinus rhythm                Electronically Signed By: Gaston Das DO  July 24, 2019  1:17 PM

## 2019-07-24 NOTE — ANESTHESIA POSTPROCEDURE EVALUATION
Anesthesia POST Procedure Evaluation    Patient: Jacque Villasenor   MRN:     [id number] Gender:   female   Age:    64 year old :      1955        Preoperative Diagnosis: Donor   Procedure(s):  Laparoscopic Hand Assisted Living Non Directed Kidney Donor   Postop Comments: No value filed.       Anesthesia Type:  No value filed.  No value filed.    Reportable Event: NO     PAIN: Uncomplicated   Sign Out status: Comfortable, Well controlled pain     PONV: No PONV   Sign Out status:  No Nausea or Vomiting     Neuro/Psych: Uneventful perioperative course   Sign Out Status: Preoperative baseline; Age appropriate mentation     Airway/Resp.: Uneventful perioperative course   Sign Out Status: Non labored breathing, age appropriate RR; Resp. Status within EXPECTED Parameters     CV: Uneventful perioperative course   Sign Out status: Appropriate BP and perfusion indices; Appropriate HR/Rhythm     Disposition:   Sign Out in:  PACU  Disposition:  Floor  Recovery Course: Uneventful  Follow-Up: Not required           Last Anesthesia Record Vitals:      Last PACU Vitals:  Vitals Value Taken Time   BP 96/80 2019  2:45 PM   Temp 36.5  C (97.7  F) 2019  2:30 PM   Pulse 85 2019  2:40 PM   Resp 16 2019  2:45 PM   SpO2 97 % 2019  2:45 PM   Temp src     NIBP     Pulse     SpO2     Resp     Temp     Ht Rate     Temp 2     Vitals shown include unvalidated device data.      Electronically Signed By: Avi Santos MD, 2019, 2:50 PM

## 2019-07-24 NOTE — ANESTHESIA POSTPROCEDURE EVALUATION
Anesthesia POST Procedure Evaluation    Patient: Jacque Villasenor   MRN:     [id number] Gender:   female   Age:    64 year old :      1955        Preoperative Diagnosis: Donor   Procedure(s):  Laparoscopic Hand Assisted Living Non Directed Kidney Donor   Postop Comments: No value filed.       Anesthesia Type:  General, Regional  No value filed.    Reportable Event: NO     PAIN: Uncomplicated   Sign Out status: Comfortable, Well controlled pain     PONV: No PONV   Sign Out status:  No Nausea or Vomiting     Neuro/Psych: Uneventful perioperative course   Sign Out Status: Preoperative baseline; Age appropriate mentation     Airway/Resp.: Uneventful perioperative course   Sign Out Status: Non labored breathing, age appropriate RR; Resp. Status within EXPECTED Parameters     CV: Uneventful perioperative course   Sign Out status: Appropriate BP and perfusion indices; Appropriate HR/Rhythm     Disposition:   Sign Out in:  PACU  Disposition:  Floor  Recovery Course: Uneventful  Follow-Up: Not required           Last Anesthesia Record Vitals:  CRNA VITALS  2019 1235 - 2019 1335      2019             EKG:  Sinus rhythm          Last PACU Vitals:  Vitals Value Taken Time   BP 96/80 2019  2:45 PM   Temp     Pulse 85 2019  2:40 PM   Resp 16 2019  2:45 PM   SpO2 97 % 2019  2:45 PM   Temp src     NIBP     Pulse     SpO2     Resp     Temp     Ht Rate     Temp 2     Vitals shown include unvalidated device data.      Electronically Signed By: Avi Santos MD, 2019, 2:50 PM

## 2019-07-24 NOTE — ANESTHESIA PROCEDURE NOTES
Peripheral Nerve Block Procedure Note    Staff:     Anesthesiologist:  Santi Macario MD    Resident/CRNA:  Nikki Esteban MD    Block performed by resident/CRNA in the presence of a teaching physician    Location: Pre-op  Procedure Start/Stop TImes:      7/24/2019 6:33 AM     7/24/2019 6:40 AM    patient identified, IV checked, site marked, risks and benefits discussed, informed consent, monitors and equipment checked, pre-op evaluation, at physician/surgeon's request and post-op pain management      Correct Patient: Yes      Correct Position: Yes      Correct Site: Yes      Correct Procedure: Yes      Correct Laterality:  Yes    Site Marked:  Yes  Procedure details:     Procedure:  TAP    ASA:  2    Laterality:  Bilateral    Position:  Supine    Sterile Prep: chloraprep      Local skin infiltration:  None    Needle:  Short bevel    Needle gauge:  21    Needle length (inches):  4    Ultrasound: Yes      Ultrasound used to identify targeted nerve, plexus, or vascular structure and placed a needle adjacent to it      Permanent Image entered into patiient's record      Abnormal pain on injection: No      Blood Aspirated: No      Paresthesias:  No    Bleeding at site: No      Bolus via:  Needle    Infusion Method:  Single Shot    Complications:  None  Assessment/Narrative:      Bilateral classic TAP blocks and left subcostal TAP block

## 2019-07-24 NOTE — PROGRESS NOTES
"POST OP CHECK     S: Patient seen at the bedside awake, alert, and interactive. Pain control fair. Requesting pain medication now.     O:   /64 (BP Location: Right arm)   Pulse 96   Temp 97.9  F (36.6  C) (Oral)   Resp 16   Ht 1.651 m (5' 5\")   Wt 70.1 kg (154 lb 8.7 oz)   SpO2 97%   BMI 25.72 kg/m      GEN: NAD  CV: Non-cyanotic, Peripheral pulses intact  RESP: Nonlabored breathing on 3L NC. Capno  ABD: soft, appropriately tender, without guarding or rebound tenderness, incisions c/d/i.   Ext: wwp. No edema. No calf tenderness  PSYCH: cooperative     A/P: Jacque Villasenor is a 64 year old female now POD 0 from left donor nephrectomy    Scheduled Tylenol, Toradol and Robaxin  PRN oxycodone, PRN fentanyl.   PRN zofran   Restart home ocular medications for glucoma.   Restart PTA ambien for insomnia  Titrate O2 supplement as tolerated for O2 sat > 92%  May discontinue fluids once intake > 500 ml and tolerating fluids  Plan for camarena removal tomorrow AM.    Dahiana Rodríguez, PAC 1036  Transplant Surgery    "

## 2019-07-24 NOTE — OR NURSING
Hemoglobin and hematocrit drawn and resulted.       Dr. Santos notified pt ready for sign out, per MD will place sign out. Pt ok to go to 7A

## 2019-07-24 NOTE — ANESTHESIA PROCEDURE NOTES
Peripheral Nerve Block Procedure Note    Staff:     Anesthesiologist:  Santi Macario MD    Resident/CRNA:  Nikki Esteban MD    Block performed by resident/CRNA in the presence of a teaching physician    Location: Pre-op  Procedure Start/Stop TImes:      7/24/2019 6:33 AM     7/24/2019 6:40 AM    patient identified, IV checked, site marked, risks and benefits discussed, informed consent, monitors and equipment checked, pre-op evaluation, at physician/surgeon's request and post-op pain management      Correct Patient: Yes      Correct Position: Yes      Correct Site: Yes      Correct Procedure: Yes      Correct Laterality:  Yes    Site Marked:  Yes  Procedure details:     Procedure:  TAP    ASA:  2    Laterality:  Bilateral    Position:  Supine    Sterile Prep: chloraprep      Local skin infiltration:  None    Needle:  Short bevel    Needle gauge:  21    Needle length (inches):  4    Ultrasound: Yes      Ultrasound used to identify targeted nerve, plexus, or vascular structure and placed a needle adjacent to it      Permanent Image entered into patiient's record      Abnormal pain on injection: No      Blood Aspirated: No      Paresthesias:  No    Bleeding at site: No      Bolus via:  Needle    Infusion Method:  Single Shot    Complications:  None  Assessment/Narrative:     Injection made incrementally with aspirations every (mL):  5     Bilateral classic TAPs and left subcostal TAP

## 2019-07-24 NOTE — BRIEF OP NOTE
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 single ureter taken at iliac crossing.  Complications: None.  Implants:  * No implants in log *

## 2019-07-25 ENCOUNTER — DOCUMENTATION ONLY (OUTPATIENT)
Dept: TRANSPLANT | Facility: CLINIC | Age: 64
End: 2019-07-25

## 2019-07-25 LAB
BUN SERPL-MCNC: 13 MG/DL (ref 7–30)
CREAT SERPL-MCNC: 1.04 MG/DL (ref 0.52–1.04)
GFR SERPL CREATININE-BSD FRML MDRD: 57 ML/MIN/{1.73_M2}
HCT VFR BLD AUTO: 33 % (ref 35–47)
HCT VFR BLD AUTO: 34.3 % (ref 35–47)
HGB BLD-MCNC: 10.6 G/DL (ref 11.7–15.7)
HGB BLD-MCNC: 11.2 G/DL (ref 11.7–15.7)

## 2019-07-25 PROCEDURE — 84520 ASSAY OF UREA NITROGEN: CPT | Performed by: SURGERY

## 2019-07-25 PROCEDURE — 85014 HEMATOCRIT: CPT | Performed by: SURGERY

## 2019-07-25 PROCEDURE — 25000128 H RX IP 250 OP 636: Performed by: SURGERY

## 2019-07-25 PROCEDURE — 85018 HEMOGLOBIN: CPT | Performed by: SURGERY

## 2019-07-25 PROCEDURE — 25000132 ZZH RX MED GY IP 250 OP 250 PS 637: Performed by: SURGERY

## 2019-07-25 PROCEDURE — 82565 ASSAY OF CREATININE: CPT | Performed by: SURGERY

## 2019-07-25 PROCEDURE — 36415 COLL VENOUS BLD VENIPUNCTURE: CPT | Performed by: SURGERY

## 2019-07-25 PROCEDURE — 25000132 ZZH RX MED GY IP 250 OP 250 PS 637: Performed by: PHYSICIAN ASSISTANT

## 2019-07-25 PROCEDURE — 25000131 ZZH RX MED GY IP 250 OP 636 PS 637: Performed by: SURGERY

## 2019-07-25 PROCEDURE — 12000026 ZZH R&B TRANSPLANT

## 2019-07-25 PROCEDURE — 25000128 H RX IP 250 OP 636: Performed by: PHYSICIAN ASSISTANT

## 2019-07-25 RX ORDER — ALENDRONATE SODIUM 70 MG/1
70 TABLET ORAL
COMMUNITY

## 2019-07-25 RX ADMIN — RANITIDINE 150 MG: 150 TABLET ORAL at 09:01

## 2019-07-25 RX ADMIN — SENNOSIDES AND DOCUSATE SODIUM 2 TABLET: 8.6; 5 TABLET ORAL at 09:01

## 2019-07-25 RX ADMIN — OXYCODONE HYDROCHLORIDE 10 MG: 5 TABLET ORAL at 12:16

## 2019-07-25 RX ADMIN — ACETAMINOPHEN 975 MG: 325 TABLET, FILM COATED ORAL at 08:54

## 2019-07-25 RX ADMIN — KETOROLAC TROMETHAMINE 15 MG: 15 INJECTION, SOLUTION INTRAMUSCULAR; INTRAVENOUS at 19:40

## 2019-07-25 RX ADMIN — OXYCODONE HYDROCHLORIDE 5 MG: 5 TABLET ORAL at 08:54

## 2019-07-25 RX ADMIN — METHOCARBAMOL 500 MG: 500 TABLET, FILM COATED ORAL at 22:49

## 2019-07-25 RX ADMIN — SODIUM CHLORIDE 500 ML: 9 INJECTION, SOLUTION INTRAVENOUS at 15:32

## 2019-07-25 RX ADMIN — ACETAMINOPHEN 975 MG: 325 TABLET, FILM COATED ORAL at 22:52

## 2019-07-25 RX ADMIN — METHOCARBAMOL 500 MG: 500 TABLET, FILM COATED ORAL at 09:01

## 2019-07-25 RX ADMIN — FENTANYL CITRATE 10 MCG: 50 INJECTION INTRAMUSCULAR; INTRAVENOUS at 10:37

## 2019-07-25 RX ADMIN — ONDANSETRON 4 MG: 4 TABLET, ORALLY DISINTEGRATING ORAL at 11:35

## 2019-07-25 RX ADMIN — PROCHLORPERAZINE MALEATE 10 MG: 5 TABLET, FILM COATED ORAL at 19:45

## 2019-07-25 RX ADMIN — BRIMONIDINE TARTRATE, TIMOLOL MALEATE 1 DROP: 2; 5 SOLUTION/ DROPS OPHTHALMIC at 19:40

## 2019-07-25 RX ADMIN — SENNOSIDES AND DOCUSATE SODIUM 2 TABLET: 8.6; 5 TABLET ORAL at 19:40

## 2019-07-25 RX ADMIN — METOCLOPRAMIDE HYDROCHLORIDE 10 MG: 5 TABLET ORAL at 20:40

## 2019-07-25 RX ADMIN — ZOLPIDEM TARTRATE 12.5 MG: 6.25 TABLET, EXTENDED RELEASE ORAL at 22:53

## 2019-07-25 RX ADMIN — KETOROLAC TROMETHAMINE 15 MG: 15 INJECTION, SOLUTION INTRAMUSCULAR; INTRAVENOUS at 03:53

## 2019-07-25 RX ADMIN — OXYCODONE HYDROCHLORIDE 5 MG: 5 TABLET ORAL at 00:54

## 2019-07-25 RX ADMIN — ONDANSETRON 4 MG: 4 TABLET, ORALLY DISINTEGRATING ORAL at 17:30

## 2019-07-25 RX ADMIN — ACETAMINOPHEN 975 MG: 325 TABLET, FILM COATED ORAL at 15:26

## 2019-07-25 RX ADMIN — OXYCODONE HYDROCHLORIDE 5 MG: 5 TABLET ORAL at 15:26

## 2019-07-25 RX ADMIN — METHOCARBAMOL 500 MG: 500 TABLET, FILM COATED ORAL at 12:17

## 2019-07-25 RX ADMIN — KETOROLAC TROMETHAMINE 15 MG: 15 INJECTION, SOLUTION INTRAMUSCULAR; INTRAVENOUS at 12:16

## 2019-07-25 RX ADMIN — METHOCARBAMOL 500 MG: 500 TABLET, FILM COATED ORAL at 17:30

## 2019-07-25 RX ADMIN — OXYCODONE HYDROCHLORIDE 10 MG: 5 TABLET ORAL at 18:33

## 2019-07-25 RX ADMIN — RANITIDINE 150 MG: 150 TABLET ORAL at 19:40

## 2019-07-25 RX ADMIN — BRIMONIDINE TARTRATE, TIMOLOL MALEATE 1 DROP: 2; 5 SOLUTION/ DROPS OPHTHALMIC at 09:00

## 2019-07-25 RX ADMIN — AMITRIPTYLINE HYDROCHLORIDE 25 MG: 25 TABLET, FILM COATED ORAL at 22:49

## 2019-07-25 ASSESSMENT — ACTIVITIES OF DAILY LIVING (ADL)
ADLS_ACUITY_SCORE: 11

## 2019-07-25 ASSESSMENT — MIFFLIN-ST. JEOR: SCORE: 1280.26

## 2019-07-25 NOTE — PHARMACY-TRANSPLANT NOTE
D/I: 64 year old female s/p kidney donation surgery on 7/24/19.  Medications have been reviewed by the pharmacist for efficacy, appropriate dose, medication interactions and potential adverse effects.      A: Medications reviewed for this patient as above. The following issues were noted and communicated to the primary team: latanoprostene and netarsudil eye drops are unavailable from pharmacy and patient does not have home supply with her - left treatment team sticky note.  P: Pharmacy will continue to monitor for any potential medication issues, and will make recommendations as appropriate. Medication therapy needs for discharge planning will continue to be addressed throughout the current admission via multidisciplinary rounds and order review.    Coretta Lizarraga, PharmD

## 2019-07-25 NOTE — PLAN OF CARE
"[id number]  BP (!) 84/51 (BP Location: Right arm)   Pulse 96   Temp 98.4  F (36.9  C) (Oral)   Resp 16   Ht 1.651 m (5' 5\")   Wt 70.1 kg (154 lb 8.7 oz)   SpO2 96%   BMI 25.72 kg/m      Pt is a kidney donor. A&O x 4, follow commands. VSS. Except last BP 84/51, Pt's HR between 70s-80s. Sp02 last 96% on 1L. Pt is asymptomatic. On-call text paged, stated to continue to monitor and to notify with changes. BP at 0615 went up to 94/61. Pt c/o abdominal/incisional pain. PRN Oxy and scheduled Toradol given and was partially effective. Pt did sleep good. Pt denied nausea this shift. Pt is on Regular diet. Pt's incision site intact with some redness/discoloration around the incision, incision approximated with liquid bandages. Pt's D5LR @ 100ml/hr in R PIV. Pt has two PIVs on L arm and is patent and SL'd. Pt has Xiao and output was 510cc this shift, urine yellow. Pt is A-1-2 for ambulation. Will continue to monitor and follow plan of care.      "

## 2019-07-25 NOTE — PHARMACY-ADMISSION MEDICATION HISTORY
Admission medication history interview status for the 7/24/2019 admission is complete. See Epic admission navigator for allergy information, pharmacy, prior to admission medications and immunization status.     Medication history interview sources:  patient, Joseph  Preferred outpatient pharmacy: [facility] Agarwal, #[phone]    Changes made to PTA medication list (reason)  Added:   - Alendronate 70 mg tablet once weekly (per patient's report)  Deleted: N/A  Changed: N/A    Additional medication history information:  - Patient reports her aspirin has been held for the surgery since last week.  - Patient reports she is taking her zolpidem 12.5 mg one tablet every night for sleep vs.the as needed dose on the prescription.      Prior to Admission medications    Medication Sig Last Dose Taking? Auth Provider   acetaminophen (ACETAMIN) 500 MG tablet Take 1-2 tablets by mouth every 6 hours as needed. Past Week at Unknown time Yes Reported, Patient   alendronate (FOSAMAX) 70 MG tablet Take 70 mg by mouth every 7 days 7/19/2019 at AM Yes Unknown, Entered By History   amitriptyline (ELAVIL) 25 MG tablet Take 25-50 mg by mouth At Bedtime  7/23/2019 at PM Yes Reported, Patient   amoxicillin (AMOXIL) 500 MG capsule Take 4 capsules by mouth 1 hour prior to dental appointment. PRN Yes Reported, Patient   brimonidine-timolol (COMBIGAN) 0.2-0.5 % ophthalmic solution Place 1 drop into both eyes 2 times daily 7/24/2019 at AM Yes Reported, Patient   Calcium Carbonate-Vitamin D (CALCIUM 600 + D OR) Take 1 tablet by mouth daily. 7/23/2019 at Unknown time Yes Reported, Patient   docusate sodium (COLACE) 100 MG capsule Take 1-2 capsules by mouth daily as needed. 7/23/2019 at 1600 Yes Reported, Patient   latanoprostene bunod (VYZULTA) 0.024 % SOLN ophthalmic solution Place 1 drop into both eyes At Bedtime 7/22/2019 at PM Yes Reported, Patient   methocarbamol (ROBAXIN) 500 MG tablet TAKE ONE TABLET BY MOUTH DAILY AS NEEDED 7/23/2019  at PM Yes Reported, Patient   netarsudil dimesylate (RHOPRESSA) 0.02 % SOLN Place 1 drop into both eyes daily  7/22/2019 at PM Yes Reported, Patient   omega-3 fatty acids (FISH OIL) 1200 MG capsule Take 1 capsule by mouth daily. Past Week at Unknown time Yes Reported, Patient   SUMAtriptan (IMITREX) 100 MG tablet Take 1 tablet by mouth at onset of headache. May repeat once in 24 hours if needed. PRN Yes Reported, Patient   zolpidem (AMBIEN CR) 12.5 MG CR tablet Take 1 tablet by mouth nightly as needed. 7/23/2019 at 2200 Yes Reported, Patient   aspirin 81 MG EC tablet Take 81 mg by mouth daily More than a month at Unknown time  Reported, Patient         Medication history completed by: Mariia Mcgowan, [employer] Pharmacy Student

## 2019-07-25 NOTE — PROGRESS NOTES
I visited the patient in the 7A room.  The patient was laying in bed awake.  The patient reports good pain control today and nausea this morning.    The patient had camarena catheter removed and has urinated without difficulty.  The patient reports going for walks.    The patient has not passed gas yet but feels she is close.  The patient is able to drink liquid and has eaten small amounts of food.  She said her pressure has been low today and they are planning to hook up the IV for fluids.  Discharge plan: Possibly tomorrow.  I gave the patient the education sheet of the milestones for discharge and things to expect after donation.  Organ specific education completed, and discharge planning has been conducted with multidisciplinary transplant care team including physicians, pharmacy, nutrition, and social work.   The patient asked how her anonymous recipient was doing.  I stated that the patient is doing fine and that the kidney is making urine.  I will continue to monitor the patients recovery progress by visiting her tomorrow and calling her on Monday.  I reviewed with her the phone number for the after hours on call Transplant nurse if she needs to problem solve after she is discharged.

## 2019-07-25 NOTE — PROVIDER NOTIFICATION
BP 84/51, on-call for surgery text paged at this time. Pt is asymptomatic, HR 70s. MD called back and stated to continue to monitor and notify with any changes.

## 2019-07-25 NOTE — PROGRESS NOTES
Transplant Surgery  Inpatient Daily Progress Note  07/25/2019    Ms. Villasenor is Post-operative day #1 s/p laparoscopic left donor nephrectomy. Issues Overnight: None     Patient Vitals for the past 24 hrs:   BP Temp Temp src Pulse Heart Rate Resp SpO2   07/25/19 0738 96/65 98.3  F (36.8  C) Oral 69 69 16 97 %   07/25/19 0619 94/61 -- -- -- -- -- --   07/25/19 0357 (!) 84/51 98.4  F (36.9  C) Oral -- 77 16 96 %   07/24/19 2340 94/58 98.3  F (36.8  C) Oral -- 75 15 94 %   07/24/19 1944 100/66 98.7  F (37.1  C) Oral -- 78 18 98 %   07/24/19 1830 102/65 -- -- -- 81 -- --   07/24/19 1800 115/73 -- -- -- 82 -- --   07/24/19 1730 109/67 -- -- -- 75 -- --   07/24/19 1715 105/76 -- -- -- 73 -- --   07/24/19 1700 104/70 -- -- -- 81 -- --   07/24/19 1645 103/65 -- -- -- 81 -- --   07/24/19 1630 -- -- -- -- 74 -- --   07/24/19 1615 96/67 -- -- -- 78 17 --   07/24/19 1609 100/60 -- -- -- 77 17 96 %   07/24/19 1600 -- -- -- -- 74 -- 95 %   07/24/19 1530 -- -- -- -- 74 17 96 %   07/24/19 1501 102/64 97.9  F (36.6  C) Oral -- 74 16 97 %   07/24/19 1445 96/80 -- -- -- 82 16 97 %   07/24/19 1430 94/62 97.7  F (36.5  C) Oral -- 78 12 98 %   07/24/19 1415 97/63 -- -- -- 85 11 97 %   07/24/19 1400 94/59 -- -- -- 75 10 95 %   07/24/19 1345 109/70 -- -- -- 77 10 98 %   07/24/19 1330 115/74 97.7  F (36.5  C) Oral -- 90 16 99 %   07/24/19 1315 113/77 -- -- -- 87 14 100 %   07/24/19 1309 122/83 97.8  F (36.6  C) Oral 96 -- 13 100 %       Pain: Visual Analog Score: 1  Nausea:    [x]    None      []    Some, but not needing medication    []    Yes, needing medication      []    Dry Retching    []    Vomited    DREAMS Performance:    DRinking: Yes   Eating: Yes    Analgesia: Good with oxycodone   Mobilizing:  Yes   Slept: Yes    Passed flatus? Not quit yet  Incision(s): C/D/I with mild erik-incisional ecchymoses  Abdomen: no distention, appropriately tender, soft  Extremities: no cyanosis or edema     Allergies:   Allergies   Allergen  Reactions     Morphine Hcl Nausea     Vicodin [Hydrocodone-Acetaminophen] Nausea       Medications:  Prescription Medications as of 7/25/2019       Rx Number Disp Refills Start End Last Dispensed Date Next Fill Date Owning Pharmacy    acetaminophen (ACETAMIN) 500 MG tablet            Sig: Take 1-2 tablets by mouth every 6 hours as needed.    Class: Historical    Route: Oral    amitriptyline (ELAVIL) 25 MG tablet    12/28/2018        Sig: Take 25 mg by mouth At Bedtime     Class: Historical    Route: Oral    amoxicillin (AMOXIL) 500 MG capsule    12/31/2018        Sig: Take 4 capsules by mouth 1 hour prior to dental appointment.    Class: Historical    aspirin 81 MG EC tablet            Sig: Take 81 mg by mouth daily    Class: Historical    Route: Oral    brimonidine-timolol (COMBIGAN) 0.2-0.5 % ophthalmic solution            Sig: Place 1 drop into both eyes 2 times daily    Class: Historical    Route: Both Eyes    Calcium Carbonate-Vitamin D (CALCIUM 600 + D OR)            Sig: Take 1 tablet by mouth daily.    Class: Historical    Route: Oral    docusate sodium (COLACE) 100 MG capsule            Sig: Take 1-2 capsules by mouth daily as needed.    Class: Historical    Route: Oral    latanoprostene bunod (VYZULTA) 0.024 % SOLN ophthalmic solution            Sig: Place 1 drop into both eyes At Bedtime    Class: Historical    Route: Both Eyes    methocarbamol (ROBAXIN) 500 MG tablet    1/1/2018        Sig: TAKE ONE TABLET BY MOUTH DAILY AS NEEDED    Class: Historical    netarsudil dimesylate (RHOPRESSA) 0.02 % SOLN            Sig: Place 1 drop into both eyes    Class: Historical    Route: Both Eyes    omega-3 fatty acids (FISH OIL) 1200 MG capsule            Sig: Take 1 capsule by mouth daily.    Class: Historical    Route: Oral    SUMAtriptan (IMITREX) 100 MG tablet            Sig: Take 1 tablet by mouth at onset of headache. May repeat once in 24 hours if needed.    Class: Historical    Route: Oral    zolpidem (AMBIEN  "CR) 12.5 MG CR tablet            Sig: Take 1 tablet by mouth nightly as needed.    Class: Historical    Route: Oral      Hospital Medications as of 7/25/2019       Dose Frequency Start End    acetaminophen (TYLENOL) tablet 975 mg 975 mg EVERY 8 HOURS 7/24/2019 7/27/2019    Sig: Take 3 tablets (975 mg) by mouth every 8 hours    Class: E-Prescribe    Route: Oral    amitriptyline (ELAVIL) tablet 25 mg 25 mg AT BEDTIME 7/24/2019     Sig: Take 1 tablet (25 mg) by mouth At Bedtime    Class: E-Prescribe    Route: Oral    brimonidine-timolol (COMBIGAN) 0.2-0.5 % ophthalmic solution 1 drop 1 drop 2 TIMES DAILY 7/24/2019     Sig: Place 1 drop into both eyes 2 times daily    Class: E-Prescribe    Route: Both Eyes    bupivacaine liposome (EXPAREL) LONG ACTING injection was administered into the infiltration site to produce postsurgical analgesia. Duration of action is up to 72 hours, and other \"quintin\" medications should not be given for 96 hours with the exception of the lidocaine 5% patch (LIDODERM) and the lidocaine 10mg in potassium infusions. This entry is for INFORMATION ONLY.  CONTINUOUS PRN 7/24/2019 7/28/2019    Sig: continuous prn    Class: E-Prescribe    Route: Does not apply    cefuroxime (ZINACEF) 1.5 g vial to attach to  ml bag for ADULTS or NS 50 ml bag for PEDS (Completed) 1.5 g PRE-OP/PRE-PROCEDURE 7/24/2019 7/24/2019    Sig: Inject 1.5 g into the vein Pre-Op/Pre-procedure x 1 dose    Class: E-Prescribe    Route: Intravenous    fentaNYL (PF) (SUBLIMAZE) injection 10-20 mcg 10-20 mcg EVERY 1 HOUR PRN 7/24/2019     Sig: Inject 0.2-0.4 mLs (10-20 mcg) into the vein every hour as needed for other (For optimal opioid multimodal pain management to improve pain control and physical function.)    Route: Intravenous    fentaNYL (PF) (SUBLIMAZE) injection 20 mcg 20 mcg EVERY 1 HOUR PRN 7/24/2019     Sig: Inject 0.4 mLs (20 mcg) into the vein every hour as needed for moderate to severe pain (break through pain)    " "Route: Intravenous    ketorolac (TORADOL) injection 15 mg (Completed) 15 mg ONCE 7/24/2019 7/24/2019    Sig: Inject 0.5 mLs (15 mg) into the vein once    Class: E-Prescribe    Route: Intravenous    ketorolac (TORADOL) injection 15 mg 15 mg EVERY 8 HOURS 7/24/2019 7/26/2019    Sig: Inject 1 mL (15 mg) into the vein every 8 hours    Class: E-Prescribe    Route: Intravenous    latanoprostene bunod (VYZULTA) 0.024 % ophthalmic solution 1 drop 1 drop AT BEDTIME 7/24/2019     Sig: Place 1 drop into both eyes At Bedtime    Class: E-Prescribe    Route: Both Eyes    methocarbamol (ROBAXIN) tablet 500 mg 500 mg 4 TIMES DAILY 7/24/2019     Sig: Take 1 tablet (500 mg) by mouth 4 times daily    Class: E-Prescribe    Route: Oral    metoclopramide (REGLAN) injection 10 mg 10 mg EVERY 6 HOURS PRN 7/24/2019     Sig: Inject 2 mLs (10 mg) into the vein every 6 hours as needed for nausea or vomiting    Class: E-Prescribe    Route: Intravenous    Linked Group 1:  \"Or\" Linked Group Details        metoclopramide (REGLAN) tablet 10 mg 10 mg EVERY 6 HOURS PRN 7/24/2019     Sig: Take 2 tablets (10 mg) by mouth every 6 hours as needed for nausea or vomiting    Class: E-Prescribe    Route: Oral    Linked Group 1:  \"Or\" Linked Group Details        naloxone (NARCAN) injection 0.1-0.4 mg 0.1-0.4 mg EVERY 2 MIN PRN 7/24/2019 7/25/2019    Sig: Inject 0.25-1 mLs (0.1-0.4 mg) into the vein every 2 minutes as needed for opioid reversal    Class: E-Prescribe    Route: Intravenous    naloxone (NARCAN) injection 0.1-0.4 mg 0.1-0.4 mg EVERY 2 MIN PRN 7/25/2019     Sig: Inject 0.25-1 mLs (0.1-0.4 mg) into the vein every 2 minutes as needed for opioid reversal    Class: E-Prescribe    Route: Intravenous    netarsudil dimesylate 0.02 % SOLN 1 drop 1 drop AT BEDTIME 7/24/2019     Sig: Place 1 drop into both eyes At Bedtime    Class: E-Prescribe    Route: Both Eyes    ondansetron (ZOFRAN) injection 4 mg 4 mg EVERY 6 HOURS PRN 7/24/2019     Sig: Inject 2 mLs (4 " "mg) into the vein every 6 hours as needed for nausea or vomiting    Class: E-Prescribe    Route: Intravenous    Linked Group 2:  \"Or\" Linked Group Details        ondansetron (ZOFRAN-ODT) ODT tab 4 mg 4 mg EVERY 6 HOURS PRN 7/24/2019     Sig: Take 1 tablet (4 mg) by mouth every 6 hours as needed for nausea or vomiting    Class: E-Prescribe    Route: Oral    Linked Group 2:  \"Or\" Linked Group Details        oxyCODONE (ROXICODONE) tablet 5-10 mg 5-10 mg EVERY 3 HOURS PRN 7/24/2019     Sig: Take 1-2 tablets (5-10 mg) by mouth every 3 hours as needed for other (For optimal opioid multimodal pain management to improve pain control and physical function.)    Route: Oral    prochlorperazine (COMPAZINE) injection 10 mg 10 mg EVERY 6 HOURS PRN 7/24/2019     Sig: Inject 2 mLs (10 mg) into the vein every 6 hours as needed for nausea or vomiting    Class: E-Prescribe    Route: Intravenous    Linked Group 3:  \"Or\" Linked Group Details        prochlorperazine (COMPAZINE) tablet 10 mg 10 mg EVERY 6 HOURS PRN 7/24/2019     Sig: Take 2 tablets (10 mg) by mouth every 6 hours as needed for nausea or vomiting    Class: E-Prescribe    Route: Oral    Linked Group 3:  \"Or\" Linked Group Details        protamine injection 50 mg (Completed) 50 mg ONCE 7/24/2019 7/24/2019    Sig: Inject 5 mLs (50 mg) into the vein once    Class: E-Prescribe    Route: Intravenous    ranitidine (ZANTAC) tablet 150 mg 150 mg 2 TIMES DAILY 7/24/2019     Sig: Take 1 tablet (150 mg) by mouth 2 times daily    Class: E-Prescribe    Route: Oral    senna-docusate (SENOKOT-S/PERICOLACE) 8.6-50 MG per tablet 1 tablet 1 tablet 2 TIMES DAILY 7/24/2019     Sig: Take 1 tablet by mouth 2 times daily    Class: E-Prescribe    Route: Oral    Linked Group 4:  \"Or\" Linked Group Details        senna-docusate (SENOKOT-S/PERICOLACE) 8.6-50 MG per tablet 2 tablet 2 tablet 2 TIMES DAILY 7/24/2019     Sig: Take 2 tablets by mouth 2 times daily    Class: E-Prescribe    Route: Oral    " "Linked Group 4:  \"Or\" Linked Group Details        SUMAtriptan (IMITREX) tablet 100 mg 100 mg DAILY PRN 7/24/2019     Sig: Take 1 tablet (100 mg) by mouth daily as needed for migraine    Class: E-Prescribe    Route: Oral    zolpidem ER (AMBIEN CR) CR tablet 12.5 mg 12.5 mg AT BEDTIME PRN 7/24/2019     Sig: Take 2 tablets (12.5 mg) by mouth nightly as needed for sleep    Class: E-Prescribe    Route: Oral          Labs:  Cr 1.04 (0.78)  Hgb 10.6 (12.4)    Assessment: Post-operative day #1, doing well.    Plan:   -Encouraged ambulation and ISB   -Continue GI and DVT prophylaxis  -Pain control: Scheduled acetaminophen. Encourage oral analgesia prn.  -Remove camarena  -Discontinue IV fluid this morning, will continue to follow SBPs and bolus fluid if needed  -Will recheck hemoglobin again this afternoon    Discharge planning: Will reassess patient later this afternoon for possible discharge later today vs tomorrow    Eliz Dodge PA-C  Division of Transplantation  Pager [phone]  Attestation:  Patient has been seen and evaluated by me.   Vital signs, labs, medications and orders were reviewed.   When obtained, diagnostic images were reviewed by me and interpreted as above.    The care plan was discussed with the multidisciplinary team and I agree with the findings and plan in this note, with any differences recorded in blue.    .    "

## 2019-07-25 NOTE — PLAN OF CARE
VSS post left donor nephrectomy.  Alert and oriented.  Able to make needs known.  C/o incisional pain.  C/o mild nausea.  Tried a cracker, which made her more nauseated.  Holding off on Tylenol and Robaxin until less nauseated.  Incisions are C/D/I. Liguid bandage and one suture on left puncture site; no drainage.  Monitored per orders. Capnography in use.   Pt. Given an IS and is able to use it.  Plan: pain control. Sit up at bedside tonight. Advance diet as tolerated.   Pt. Has one son who has gone home for the day.

## 2019-07-26 ENCOUNTER — DOCUMENTATION ONLY (OUTPATIENT)
Dept: TRANSPLANT | Facility: CLINIC | Age: 64
End: 2019-07-26

## 2019-07-26 VITALS
HEART RATE: 70 BPM | TEMPERATURE: 98.8 F | SYSTOLIC BLOOD PRESSURE: 99 MMHG | DIASTOLIC BLOOD PRESSURE: 65 MMHG | WEIGHT: 160.4 LBS | HEIGHT: 65 IN | RESPIRATION RATE: 16 BRPM | OXYGEN SATURATION: 93 % | BODY MASS INDEX: 26.73 KG/M2

## 2019-07-26 LAB
HCT VFR BLD AUTO: 33.1 % (ref 35–47)
HGB BLD-MCNC: 10.8 G/DL (ref 11.7–15.7)

## 2019-07-26 PROCEDURE — 85014 HEMATOCRIT: CPT | Performed by: SURGERY

## 2019-07-26 PROCEDURE — 36415 COLL VENOUS BLD VENIPUNCTURE: CPT | Performed by: SURGERY

## 2019-07-26 PROCEDURE — 25000132 ZZH RX MED GY IP 250 OP 250 PS 637: Performed by: SURGERY

## 2019-07-26 PROCEDURE — 85018 HEMOGLOBIN: CPT | Performed by: SURGERY

## 2019-07-26 RX ORDER — ACETAMINOPHEN 500 MG
500-1000 TABLET ORAL EVERY 6 HOURS PRN
Start: 2019-07-29 | End: 2019-07-26

## 2019-07-26 RX ORDER — OXYCODONE HYDROCHLORIDE 5 MG/1
5-10 TABLET ORAL
Qty: 20 TABLET | Refills: 0 | Status: SHIPPED | OUTPATIENT
Start: 2019-07-26 | End: 2019-09-04

## 2019-07-26 RX ORDER — ACETAMINOPHEN 500 MG
500-1000 TABLET ORAL EVERY 6 HOURS PRN
Qty: 100 TABLET | Refills: 1 | Status: SHIPPED | OUTPATIENT
Start: 2019-07-29 | End: 2019-07-26

## 2019-07-26 RX ORDER — ACETAMINOPHEN 325 MG/1
975 TABLET ORAL EVERY 8 HOURS
Qty: 27 TABLET | Refills: 0 | Status: SHIPPED | OUTPATIENT
Start: 2019-07-26 | End: 2019-07-26

## 2019-07-26 RX ORDER — METHOCARBAMOL 500 MG/1
500 TABLET, FILM COATED ORAL 4 TIMES DAILY PRN
Qty: 30 TABLET | Refills: 0 | Status: SHIPPED | OUTPATIENT
Start: 2019-07-26

## 2019-07-26 RX ORDER — BISACODYL 10 MG
10 SUPPOSITORY, RECTAL RECTAL ONCE
Status: DISCONTINUED | OUTPATIENT
Start: 2019-07-26 | End: 2019-07-26 | Stop reason: HOSPADM

## 2019-07-26 RX ORDER — ACETAMINOPHEN 500 MG
TABLET ORAL
Qty: 100 TABLET | Refills: 0 | Status: SHIPPED | OUTPATIENT
Start: 2019-07-26

## 2019-07-26 RX ORDER — AMOXICILLIN 250 MG
1 CAPSULE ORAL 2 TIMES DAILY
Qty: 60 TABLET | Refills: 1 | Status: SHIPPED | OUTPATIENT
Start: 2019-07-26 | End: 2019-09-04

## 2019-07-26 RX ORDER — ONDANSETRON 4 MG/1
4 TABLET, ORALLY DISINTEGRATING ORAL EVERY 6 HOURS PRN
Qty: 20 TABLET | Refills: 1 | Status: SHIPPED | OUTPATIENT
Start: 2019-07-26 | End: 2019-09-04

## 2019-07-26 RX ADMIN — ACETAMINOPHEN 975 MG: 325 TABLET, FILM COATED ORAL at 08:32

## 2019-07-26 RX ADMIN — METHOCARBAMOL 500 MG: 500 TABLET, FILM COATED ORAL at 08:32

## 2019-07-26 RX ADMIN — SENNOSIDES AND DOCUSATE SODIUM 2 TABLET: 8.6; 5 TABLET ORAL at 08:33

## 2019-07-26 RX ADMIN — METHOCARBAMOL 500 MG: 500 TABLET, FILM COATED ORAL at 11:36

## 2019-07-26 RX ADMIN — ACETAMINOPHEN 975 MG: 325 TABLET, FILM COATED ORAL at 14:56

## 2019-07-26 RX ADMIN — BRIMONIDINE TARTRATE, TIMOLOL MALEATE 1 DROP: 2; 5 SOLUTION/ DROPS OPHTHALMIC at 08:32

## 2019-07-26 RX ADMIN — RANITIDINE 150 MG: 150 TABLET ORAL at 08:33

## 2019-07-26 RX ADMIN — OXYCODONE HYDROCHLORIDE 5 MG: 5 TABLET ORAL at 14:04

## 2019-07-26 ASSESSMENT — ACTIVITIES OF DAILY LIVING (ADL)
ADLS_ACUITY_SCORE: 11
ADLS_ACUITY_SCORE: 14
ADLS_ACUITY_SCORE: 14
ADLS_ACUITY_SCORE: 13

## 2019-07-26 ASSESSMENT — MIFFLIN-ST. JEOR: SCORE: 1278.45

## 2019-07-26 NOTE — DISCHARGE SUMMARY
[facility], [place]    Discharge Summary  Solid Organ Transplant Surgery    Date of Admission:  7/24/2019  Date of Discharge:  7/26/2019  Date of Service (when I saw the patient): 07/26/19    Discharge Diagnoses   Active Problems:    Encounter for donation of kidney      Procedure/Surgery Information   Procedure: Procedure(s):  Laparoscopic Hand Assisted Living Non Directed Kidney Donor   Surgeon(s): Surgeon(s) and Role:     * Tahir Zaldivar MD - Primary             History of Present Illness   Jacque Villasenor is a 64 year old female who presented for nephrectomy for kidney donation.    Hospital Course   Jacque Villasenor was admitted on 7/24/2019 and underwent nephrectomy for kidney donation.      The patient's hospital course was unremarkable.  She recovered as anticipated and experienced no post-operative complications.    Tolerating diet. Passing flatus. Pain controlled.     Dahiana Rodríguez PA-C    Post-operative pain control:  included bupivacaine injection, fentanyl IV, Toradol, oxycodone and acetaminophen and will be Oxycodone, robaxin and tylenol on discharge. Abdominal binder.     General: NAD  Resp: Well perfused  Cards: NLB on RA  Incision(s): C/D/I with mild erik-incisional ecchymoses  Abdomen: no distention, appropriately tender, soft  Extremities: no cyanosis or edema   Neuro: Oriented x 3.    Discharge Disposition   Discharged to home   Condition at discharge: Stable    Primary Care Physician   Patrizia Martinez        Consultations This Hospital Stay   PHARMACY IP CONSULT  SOT MEDICATION HISTORY IP PHARMACY CONSULT    Time Spent on this Encounter   I have spent greater than 30 minutes on this discharge.    Discharge Orders      Reason for your hospital stay    Donor nephrectomy     Adult [facility] Follow-up and recommended labs and tests    Follow up with Dr. Zaldivar in Transplant Clinic on 8/8/19 at 8:45 am.  If you need to change your appointment please contact your  coordinator at [phone].     Activity    Don't lift anything heavier than 10 pounds for 6 weeks (or longer if your surgeon has discussed this with you)..   Walk regularly.    OK to drive only after no longer taking narcotics AND you feel comfortable working the breaks/clutch suddenly if needed.  Limit sports and strenuous activities/'core' exercises for 6 weeks.  If you experience pain after exertion, try an ice pack or warm pack to the area.   Wear abdominal binder for comfort if you desire, but only when out of bed.    You have been instructed to avoid NSAIDs (ibuprofen/aspirin like medications) as these medications may affect the remaining kidney. Take other medications as prescribed.    Keep narcotics out of reach of children. When finished with them, please destroy/discard any remaining pills responsibly. Often, your [facility] government office, local police station or pharmacy will have a drug deposit box.     When to contact your care team    Your transplant coordinator if you have any of the following:   Swelling, oozing, worsening pain, unusual redness around the incision, or if:  Fever of 101.5 F or higher   Increasing abdominal pain   Nausea or vomiting   Severe diarrhea, bloating, or constipation     Any concerns or questions, please call your Transplant coordinator:    Phone: [phone].  If they are not available, the on call coordinator/MD will help you with your concern.     Wound care and dressings    OK to shower. Gently wash around your incisions with soap and water.   Don't bathe/submerge incisions until glue is off and any openings are closed.  Wear loose-fitting clothes. This will help you be more comfortable and cause less irritation around your incisions.     Diet    Keep yourself well hydrated (goal 1500 ml/day).   Eat lightly the first week as tolerated and avoid constipating foods.  If you are constipated, take a stool softener like Senna/Colace/Dulcolax/Miralax.     Discharge  Medications   Current Discharge Medication List      START taking these medications    Details   ondansetron (ZOFRAN-ODT) 4 MG ODT tab Take 1 tablet (4 mg) by mouth every 6 hours as needed for nausea or vomiting  Qty: 20 tablet, Refills: 1    Associated Diagnoses: Encounter for donation of kidney      oxyCODONE (ROXICODONE) 5 MG tablet Take 1-2 tablets (5-10 mg) by mouth every 3 hours as needed for pain  Qty: 20 tablet, Refills: 0    Associated Diagnoses: Encounter for donation of kidney      senna-docusate (SENOKOT-S/PERICOLACE) 8.6-50 MG tablet Take 1 tablet by mouth 2 times daily  Qty: 60 tablet, Refills: 1    Associated Diagnoses: Encounter for donation of kidney         CONTINUE these medications which have CHANGED    Details   acetaminophen (ACETAMIN) 500 MG tablet Take 2 tablets every 8 hours x 3 days then may take 1-2 tablets every 6 hours PRN mild/moderate pain.  Qty: 100 tablet, Refills: 0    Associated Diagnoses: Encounter for donation of kidney      amitriptyline (ELAVIL) 25 MG tablet Take 1 tablet (25 mg) by mouth At Bedtime    Associated Diagnoses: Encounter for donation of kidney      latanoprostene bunod (VYZULTA) 0.024 % SOLN ophthalmic solution Place 1 drop into both eyes At Bedtime    Associated Diagnoses: Encounter for donation of kidney      methocarbamol (ROBAXIN) 500 MG tablet Take 1 tablet (500 mg) by mouth 4 times daily as needed for muscle spasms  Qty: 30 tablet, Refills: 0    Associated Diagnoses: Encounter for donation of kidney         CONTINUE these medications which have NOT CHANGED    Details   alendronate (FOSAMAX) 70 MG tablet Take 70 mg by mouth every 7 days      amoxicillin (AMOXIL) 500 MG capsule Take 4 capsules by mouth 1 hour prior to dental appointment.      brimonidine-timolol (COMBIGAN) 0.2-0.5 % ophthalmic solution Place 1 drop into both eyes 2 times daily      Calcium Carbonate-Vitamin D (CALCIUM 600 + D OR) Take 1 tablet by mouth daily.      docusate sodium (COLACE) 100  MG capsule Take 1-2 capsules by mouth daily as needed.      netarsudil dimesylate (RHOPRESSA) 0.02 % SOLN Place 1 drop into both eyes daily       omega-3 fatty acids (FISH OIL) 1200 MG capsule Take 1 capsule by mouth daily.      SUMAtriptan (IMITREX) 100 MG tablet Take 1 tablet by mouth at onset of headache. May repeat once in 24 hours if needed.      zolpidem (AMBIEN CR) 12.5 MG CR tablet Take 1 tablet by mouth nightly as needed.      aspirin 81 MG EC tablet Take 81 mg by mouth daily           Allergies   Allergies   Allergen Reactions     Morphine Hcl Nausea     Vicodin [Hydrocodone-Acetaminophen] Nausea     Data   Most Recent 3 CBC's:  Recent Labs   Lab Test 07/26/19  0608 07/25/19  1338 07/25/19  0553  02/08/19  0720   WBC  --   --   --   --  4.4   HGB 10.8* 11.2* 10.6*   < > 13.5   MCV  --   --   --   --  87   PLT  --   --   --   --  177    < > = values in this interval not displayed.      Most Recent 3 BMP's:  Recent Labs   Lab Test 07/25/19  0553 07/16/19  1217 02/08/19  0720   NA  --   --  142   POTASSIUM  --   --  3.6   CHLORIDE  --   --  108   CO2  --   --  28   BUN 13  --  16   CR 1.04 0.78 0.86   ANIONGAP  --   --  6   STEFANO  --   --  8.2*   GLC  --   --  94     Most Recent 2 LFT's:  Recent Labs   Lab Test 02/08/19  0720   AST 16   ALT 20   ALKPHOS 49   BILITOTAL 0.5     Most Recent INR's and Anticoagulation Dosing History:  Anticoagulation Dose History     Recent Dosing and Labs Latest Ref Rng & Units 2/8/2019    INR 0.86 - 1.14 1.03        Most Recent 3 Troponin's:No lab results found.  Most Recent Cholesterol Panel:  Recent Labs   Lab Test 02/08/19  0720   CHOL 184   *   HDL 57   TRIG 65     Most Recent 6 Bacteria Isolates From Any Culture (See EPIC Reports for Culture Details):No lab results found.  Most Recent TSH, T4 and A1c Labs:  Recent Labs   Lab Test 02/08/19  0720   A1C 5.2     Attestation:  Patient has been seen and evaluated by me.   Vital signs, labs, medications and orders were  reviewed.   When obtained, diagnostic images were reviewed by me and interpreted as above.    The care plan was discussed with the multidisciplinary team and I agree with the findings and plan in this note, with any differences recorded in blue.    .

## 2019-07-26 NOTE — PROGRESS NOTES
LIVING DONOR SOCIAL WORK AND INDEPENDENT LIVING DONOR ADVOCATE NOTE:  D:  Ms. Jacque Villasenor is a living kidney donor, POD #2.  I:  I met with her at bedside to thank her for her donation and to assess for any psychosocial needs and to assist with discharge planning.  I assessed the patient's mood/affect, plans for recovery, and any feelings of regret/remorse regarding donation.   A:  Ms. Villasenor is walking the hallway on the unit when I arrived.  She appears to be in a positive mood and affect is congruent.  She states that pain is well controlled.  Patient describes donation recovery as going pretty well so far.  she and I discussed how to reach me should she have any post operative psychosocial needs.  Ms. Villasenor reports no feelings of regret or remorse about donation.  She denies any discharge planning needs at this time.  Patient plans to discharge to home with the care and support of her family.   P: Donor /TOREY will remain available to assist with any psychosocial and advocacy needs, both inpatient and outpatient, as needed.  Patient is aware of follow-up recommendations and has my contact information.    GERA Felix, [facility]  Clinical  and Independent Donor Advocate  [facility]  Pager:  [phone]  Direct:  [phone]

## 2019-07-26 NOTE — PLAN OF CARE
"/68 (BP Location: Right arm)   Pulse 70   Temp 98.7  F (37.1  C) (Oral)   Resp 16   Ht 1.651 m (5' 5\")   Wt 72.9 kg (160 lb 12.8 oz)   SpO2 94%   BMI 26.76 kg/m      Patient with soft BPs (90-100s/60s) OVSS on RA, afebrile. Pain managed with scheduled toradol, tylenol and robaxin; PRN oxycodone available; nausea managed with compazine x1 sea bands. No SOB. Tolerating regular diet with good appetite. L PIV infiltrated, spoke with oncall, permission to wait until AM so pt can sleep, given. Voiding adequately; passing gas, no BM since surgery. Midline incision and lap sites dermabonded. Up ad jyothi, calls appropriately.  Encouraging PO intake. Possible discontinue today.   Will continue with POC and notify MD with changes or concerns.    "

## 2019-07-26 NOTE — PROGRESS NOTES
I visited with the patient on unit 7A.  She was dressed in regular clothing and waiting for her daughter to come to pick her up to go home.  We reviewed that she has had a good day and the nausea has subsided.  She has eaten and is able to drink adequate amount of fluids.  She reports urinating without difficulty and passing gas.  She feels a little soreness in her throat today.  I reviewed that is typically from the airway tube that she had during surgery.  I recommended hydration and losenges if she has any at home.  She reports good pain control today.  She is aware of the clinic appointment.    I answered her questions.  I will plan to call the patient on Monday to check on her progress.

## 2019-07-26 NOTE — PROGRESS NOTES
D: Pain and nausea present.  No BM's, but goid UO after Xiao removed. PVR=1cc.  I:   Meds given to treat these.  Encouraged to walk.  A:  Worked well, per pt.  Walked far a few times today.  P:  BM tomorrow...

## 2019-07-26 NOTE — PROVIDER NOTIFICATION
Paged on-call r/t pt PIV infiltrated prior to ketorolac; pt says pt managed without it; asking if PIV can wait until morning allowing sleep d/t probable discharge today.    MD verbally confirmed that it was fine for leave PIV out until AM.

## 2019-07-26 NOTE — PLAN OF CARE
"BP 99/65 (BP Location: Right arm)   Pulse 70   Temp 98.8  F (37.1  C) (Oral)   Resp 16   Ht 1.651 m (5' 5\")   Wt 72.8 kg (160 lb 6.4 oz)   SpO2 93%   BMI 26.69 kg/m   AVSS on RA. Patient c/o incisional pain, received scheduled analgesics and PRN oxycodone x1. Patient denies nausea. Urine Output - voiding adequately. Bowel Function - no BM, passing gas, declined suppository. Nutrition - tolerating regular diet w/ good appetite. Activity - up ad jyothi, showered. Education - reviewed discharge orders. Plan of Care - pt to discharge around 1600 when ride arrives.     "

## 2019-07-27 NOTE — PHARMACY-CONSULT NOTE
[facility] Donor Prior to Discharge Note  64 year old female s/p kidney donation surgery on 7/27/19.     Pharmacy has monitored for any potential medication issues.  In anticipation for discharge, medication therapy needs have been addressed daily throughout the current admission via multidisciplinary rounds and/or discussions, order verification, daily clinical pharmacy review, and communication with prescribers.

## 2019-07-29 ENCOUNTER — TELEPHONE (OUTPATIENT)
Dept: TRANSPLANT | Facility: CLINIC | Age: 64
End: 2019-07-29

## 2019-07-29 NOTE — TELEPHONE ENCOUNTER
The donor reports doing well following discharge from the hospital.    The patient has had bowel movements she reports but she still feels like she is constipated.  She took a suppository on Sunday and had some effect.  I have recommended that she get another one and try that. She is also taking Miralax and stool softners.   She reports urinating without difficulty.  The patient reports good pain control.  Wound is dry and intact.  The patient reports good appetite.  The patient reports good activity level. In fact she did 8 minutes on the treadmill she said. She is aware of the surgeon visit.  I encouraged her to call me with any issues.

## 2019-07-31 NOTE — OP NOTE
Transplant Surgery  Operative Note     Procedure Date:  07/24/19    Preoperative Diagnosis:  Healthy kidney donor    Postoperative Diagnosis:  Healthy kidney donor    Procedure:  1. Left Kidney Laparoscopic Hand-Assisted living donor nephrectomy for donation      Secondary Procedure:    none    Surgeon:    Surgeon(s) and Role:     * Tahir Zaldivar MD - Primary     * Prakash Elam MD - Fellow - Assisting    Fellow/Assistant:    Fransico Viera. There was no qualified assistant to assist with this procedure.    Specimen:  Left kidney and ureter    Anesthesia:    General    Urine Output: 600 mls  Estimated Blood Loss: 150 mls   Fluids administered:      Intra Op Events: none     Complications: None.    Findings: two left renal arteries. Single vein taken distal to gonadal/adrenal insertions. Single ureter taken at iliac crossing.       None.        Donor UNOS ID:    [id number]  Flush Start time:  7/24/2019 11:42 AM    Arterial Clamp:    7/24/2019 11:40 AM  Arterial anatomy:  Double    Venous anatomy:    Single  Ureteral anatomy:   Single     Indication: Jacque Villasenor presents for Left Kidney donation. The patient has undergone a thorough medical and psychosocial evaluation and was found suitable for voluntary kidney donation. Risks and benefits of donation were discussed. The patient expressed understanding, was willing to proceed, and provided informed consent.    Final ABO/Crossmatch verification: Prior to incision, I verified the donor ABO and recipient ABO. I visually verified that the donor identification, blood type, and other vital data were compatible with the recipient.      Operative Procedure:  Jacque Villasenor was transported to the operating room, placed on the operating table in the right lateral decubitus position, and a universal timeout was performed. Sequential compression devices were placed on both lower extremities and general endotracheal anesthesia was  induced. The patient was given IV antibiotics and Xiao catheter.  The abdomen was shaved, prepped, and draped in the usual sterile fashion.    We made a 6 cm upper midline incision and carried down thru the linea alba. The peritoneum was opened under direct vision. The hand port was put into position and a left lower quadrant 10 mm port was placed over a trocar with hand assistance. Pneumoperitoneum with CO2 was provided to 12 mmHg. General survey with the laparoscope revealed no unusual findings. An additional 10 mm port was placed in the left lateral abdomen under direct vision.     We released the left colon from its lateral attachments and rotated medially, revealing the kidney and ureter. The ureter was circumferentially dissected free distally toward the pelvis then kidney taking care to preserve its vasculature. The gonadal vein was identified and dissected enbloc with the ureter, and the proximal gonadal vein was doubly clipped and divided near the insertion into the left renal vein. The left adrenal vein was likewise identified, ligated and divided. The renal vein was then circumferentially cleared of extraneous tissue. The lumbar vein was clipped and divided off the posterior renal vein.  We created a plane between the left adrenal gland and the upper pole of the kidney with the harmonic scalpel and the upper pole attachments were released.    The anterior and inferior aspects of the renal artery at its origin were cleared of investing lymphatics. The patient was given fluid, mannitol and lasix, and the kidney was then dissected free from its lateral attachments allowing full medial rotation. The remaining lymphatics and fat around the renal arteries were cleared. The patient was heparinized and the distal ureter and gonadal vein were clipped and divided. Good urine flow was seen. A laparoscopic BRIGETTE stapler was fired across each renal artery separately and then the renal vein.     The kidney was delivered  from the hand port, flushed with cold HTK, and taken to recipient room. Protamine was administered for full heparin reversal. Pneumoperitoneum was reestablished and hemostasis was obtained. Vascular transection sites were visualized and confirmed secure. The colon was placed back in its natural position. The 10 mm port sites were closed with 0-vicryl. The hand port was closed with 0-PDS. Skin incisions were irrigated and closed with 4-0 monocryl and Dermabond. Needle, sponge, and instrument counts were correct x2.  Faculty was present for key portions of the procedure. The patient tolerated the procedure well without apparent complications and was extubated and transferred to PACU in good condition.   I was present from introduction of the camera to kidney removal and flushing.  I brought the kidney into the recipient room. Lázaro Zaldivar

## 2019-08-02 ENCOUNTER — TELEPHONE (OUTPATIENT)
Dept: TRANSPLANT | Facility: CLINIC | Age: 64
End: 2019-08-02

## 2019-08-02 NOTE — TELEPHONE ENCOUNTER
I called the patient to check on her progress.  She reports doing well and having regular bowel movements now.  She reports good pain control.  She said she has been active and feeling fine.  We reviewed a little dab of drainage that she has seen occasionally on her clothing, it is very small amount.  I asked her to assess for any sign of infection and let us know if the amount increases or the coloration.    We reviewed plan for her Thursday post visit with Dr Zaldivar.  I encouraged her to call with any questions.

## 2019-08-08 ENCOUNTER — OFFICE VISIT (OUTPATIENT)
Dept: TRANSPLANT | Facility: CLINIC | Age: 64
End: 2019-08-08
Attending: TRANSPLANT SURGERY

## 2019-08-08 VITALS — SYSTOLIC BLOOD PRESSURE: 96 MMHG | OXYGEN SATURATION: 96 % | HEART RATE: 92 BPM | DIASTOLIC BLOOD PRESSURE: 66 MMHG

## 2019-08-08 DIAGNOSIS — Z52.4 KIDNEY DONOR: Primary | ICD-10-CM

## 2019-08-08 NOTE — PROGRESS NOTES
"Live kidney donation 7/24.  Doing well.  No need for narcotics, eating, urinating, defecating normally.  No fever or chills.  No dysuria.    BP 96/66   Pulse 92   SpO2 96%   Incisions: healing normally.  No erythema    I/P  Normal postop recovery  Routine followup  Call if any sig prob.  Spoke about \"good thing\" she had done, expressed gratitude and the importance of followup  "

## 2019-08-08 NOTE — PROGRESS NOTES
Chief Complaint   Patient presents with     Transplant Donor Evaluation     kidney- post-op 2weeks     Blood pressure 96/66, pulse 92, SpO2 96 %.    Anahi Sparks, CMA

## 2019-08-08 NOTE — LETTER
"8/8/2019       RE: Jacque Villasenor  [id number] [facility] [id number]     Dear Colleague,    Thank you for referring your patient, Jacque Villasenor, to the [facility] SOLID ORGAN TRANSPLANT at [facility]. Please see a copy of my visit note below.    Chief Complaint   Patient presents with     Transplant Donor Evaluation     kidney- post-op 2weeks     Blood pressure 96/66, pulse 92, SpO2 96 %.    Anahi Sparks CMA       Live kidney donation 7/24.  Doing well.  No need for narcotics, eating, urinating, defecating normally.  No fever or chills.  No dysuria.    BP 96/66   Pulse 92   SpO2 96%   Incisions: healing normally.  No erythema    I/P  Normal postop recovery  Routine followup  Call if any sig prob.  Spoke about \"good thing\" she had done, expressed gratitude and the importance of followup    Again, thank you for allowing me to participate in the care of your patient.      Sincerely,    Tahir Zaldivar MD      "

## 2019-08-20 ENCOUNTER — DOCUMENTATION ONLY (OUTPATIENT)
Dept: TRANSPLANT | Facility: CLINIC | Age: 64
End: 2019-08-20

## 2019-08-20 NOTE — PROGRESS NOTES
Received email from Melisa. She is wondering if she can take a vegan protein powder. Responded to pt. Yes, can do a vegan protein powder. Reviewed recommendation to not consistently exceed 70 g protein/day from all sources. If pt struggling to get adequate protein, protein powders are certainly fine in moderation. Pt expressed understanding.

## 2019-08-26 ENCOUNTER — TELEPHONE (OUTPATIENT)
Dept: TRANSPLANT | Facility: CLINIC | Age: 64
End: 2019-08-26

## 2019-08-26 NOTE — TELEPHONE ENCOUNTER
I received phone call back from the patient after I had left a message with the patient regarding her RTW letter.  She said she is returning on 9/2/19.  I have faxed RTW letter to her work.    She reports a speckly rash on her abdomen that started a week ago.  She said it is very sensitive to clothing.  I recommended her to use hydrocortisone cream.  Also at bedtime she could take benadryl.  I asked her to let me know if it does not improve and we can have her be seen in clinic by either Elle Brasher or Dermatology.  She said she is feeling fine, just tired at times.  She notes that if she does not hydrate sufficiently she feels more fatigue the next day.

## 2019-08-30 ENCOUNTER — TELEPHONE (OUTPATIENT)
Dept: TRANSPLANT | Facility: CLINIC | Age: 64
End: 2019-08-30

## 2019-08-30 DIAGNOSIS — Z52.4 KIDNEY DONOR: Primary | ICD-10-CM

## 2019-08-30 NOTE — TELEPHONE ENCOUNTER
Melisa called to say that the problem with her dermatitis is spreading along her sides.  She said that hydrocortisone cream is helping relieve the itching.  She has not taken any benadryl.    I recommend that she use Aveeno Baby wash, take zyrtec during the day over the counter and she could use benadryl at bedtime.  She would like to have a place [facility] appointment next week with Dermatology if this does not improve.  She is starting back to work next week.  I have placed referral and tasked scheduling pool and will update our Transplant Nurse Practitioner that the patient's issue has spread to her sides.  Melisa states that just along the sides of her incision there is a reddened area that she is monitoring.  She denies fever.

## 2019-09-03 ENCOUNTER — TELEPHONE (OUTPATIENT)
Dept: TRANSPLANT | Facility: CLINIC | Age: 64
End: 2019-09-03

## 2019-09-03 DIAGNOSIS — L30.9 DERMATITIS: Primary | ICD-10-CM

## 2019-09-03 NOTE — TELEPHONE ENCOUNTER
Pt is still looking to set up her dermatology appt- please connect with the pt to set up the appt.

## 2019-09-03 NOTE — TELEPHONE ENCOUNTER
Melisa called this morning to report that the dermatitis continues and now it involves her arms.  She said the abdominal rash seems to be improved with use of the hydrocortisone cream. She has taken benadryl and bedtime.  She is concerned that it is now on arms and is quite uncomfortable with itching.    Melisa is otherwise well, she said.  Today was her first day back at work.  We reviewed her 6 week labs which were normal.  I have notified the Nurse Practitioner, Elle Brasher who has contacted Dermatology clinic and secured a 0800 Derm clinic appointment for  9/4.    I have called the patient back and she confirms the appointment and thanks the team for accommodating her issue.

## 2019-09-04 ENCOUNTER — OFFICE VISIT (OUTPATIENT)
Dept: DERMATOLOGY | Facility: CLINIC | Age: 64
End: 2019-09-04
Payer: COMMERCIAL

## 2019-09-04 DIAGNOSIS — L30.9 DERMATITIS: Primary | ICD-10-CM

## 2019-09-04 RX ORDER — TRIAMCINOLONE ACETONIDE 1 MG/G
OINTMENT TOPICAL 2 TIMES DAILY
Qty: 80 G | Refills: 3 | Status: SHIPPED | OUTPATIENT
Start: 2019-09-04

## 2019-09-04 RX ORDER — TRIAMCINOLONE ACETONIDE 1 MG/G
CREAM TOPICAL 2 TIMES DAILY
Qty: 80 G | Refills: 3 | Status: SHIPPED | OUTPATIENT
Start: 2019-09-04

## 2019-09-04 ASSESSMENT — PAIN SCALES - GENERAL: PAINLEVEL: NO PAIN (0)

## 2019-09-04 NOTE — LETTER
"9/4/2019       RE: Jacque Villasenor  [id number] [facility] [id number]     Dear Colleague,    Thank you for referring your patient, Jacque Villasenor, to the [facility] DERMATOLOGY at [facility]. Please see a copy of my visit note below.    [facility] Dermatology Note      Dermatology Problem List:  1. Dermatitis  -Current tx: triamcinolone 0.1% cream, alternating with 0.1% ointment    Encounter Date: Sep 4, 2019    CC:  Chief Complaint   Patient presents with     Derm Problem     Melisa is here today to be seen for a rash on her abdomen and forearms- Melisa states \"it's spreading\".          History of Present Illness:  Ms. Jacque Villasenor is a 64 year old female who is new to the clinic and presents for dermatitis. At today's visit, the patient notes that she donated a kidney approximately 6 weeks ago. Approximately 2 weeks ago, the patient developed a rash on her abdomen. She then called the transplant team who advised her to use hydrocortisone cream, take benadryl pills at night, and use Aveeno or Cerave cream during the day. The patient states that the itching subsided completely within a few days on this regimen and the redness started to subside in approximately 1 week. It is nearly resolved on her abdomen. It was inially the worse near and around the incision sites on the abdomen. 4 days ago a similar rash appeared on her bilateral forearms. But today, the rash is not present or minimally present on her forearms. The rash was present yesterday, and she noticed an itch. The patient states she has been following the transplant team's advised plan still. She complains her forearms still itch. All she has used on her forearms is the Cerave Cream. She has not used hydrocortisone. Cream. Has continued to take nightly benadryl. When the rash appears on her forearms, it feels and looks the same as the rash on the stomach. The patient denies a history " of eczema or rashes, seasonal allergies and asthma. She does not believe she has made any significant diet, hygiene or lifestyle changes since her transplant. All of her medications have remained unchanged and are the same medications she has lindy on for years. No new additional medications in the past several months. The patient states that around when the rash first appeared on the stomach, there was a small amount of adhesive left over from the operation on her stomach. The patient denies painful, itching, tingling or bleeding lesions unless otherwise noted.    Past Medical History:   Patient Active Problem List   Diagnosis     CARDIOVASCULAR SCREENING; LDL GOAL LESS THAN 160     Transplant donor evaluation     Encounter for donation of kidney     Past Medical History:   Diagnosis Date     Cataract      Fibromyalgia      Glaucoma      Insomnia      Insomnia      Osteoarthritis      Osteoporosis      Osteoporosis      Past Surgical History:   Procedure Laterality Date     AS TOTAL KNEE ARTHROPLASTY       BUNIONECTOMY       HYSTERECTOMY       LAPAROSCOPIC DONOR HAND ASSISTED KIDNEY LIVING UNRELATED N/A 7/24/2019    Procedure: Laparoscopic Hand Assisted Living Non Directed Kidney Donor;  Surgeon: Tahir Zaldivar MD;  Location: [facility] OR     TUBAL/ECTOPIC PREGNANCY         Social History:   reports that she has never smoked. She has never used smokeless tobacco. She reports that she drinks alcohol. She reports that she does not use drugs.    Family History:  Family History   Problem Relation Age of Onset     Coronary Artery Disease Maternal Grandmother        Medications:  Current Outpatient Medications   Medication Sig Dispense Refill     acetaminophen (ACETAMIN) 500 MG tablet Take 2 tablets every 8 hours x 3 days then may take 1-2 tablets every 6 hours PRN mild/moderate pain. 100 tablet 0     alendronate (FOSAMAX) 70 MG tablet Take 70 mg by mouth every 7 days       amitriptyline (ELAVIL) 25 MG tablet Take 1 tablet  (25 mg) by mouth At Bedtime       amoxicillin (AMOXIL) 500 MG capsule Take 4 capsules by mouth 1 hour prior to dental appointment.       aspirin 81 MG EC tablet Take 81 mg by mouth daily       brimonidine-timolol (COMBIGAN) 0.2-0.5 % ophthalmic solution Place 1 drop into both eyes 2 times daily       Calcium Carbonate-Vitamin D (CALCIUM 600 + D OR) Take 1 tablet by mouth daily.       docusate sodium (COLACE) 100 MG capsule Take 1-2 capsules by mouth daily as needed.       latanoprostene bunod (VYZULTA) 0.024 % SOLN ophthalmic solution Place 1 drop into both eyes At Bedtime       methocarbamol (ROBAXIN) 500 MG tablet Take 1 tablet (500 mg) by mouth 4 times daily as needed for muscle spasms 30 tablet 0     netarsudil dimesylate (RHOPRESSA) 0.02 % SOLN Place 1 drop into both eyes daily        omega-3 fatty acids (FISH OIL) 1200 MG capsule Take 1 capsule by mouth daily.       SUMAtriptan (IMITREX) 100 MG tablet Take 1 tablet by mouth at onset of headache. May repeat once in 24 hours if needed.       zolpidem (AMBIEN CR) 12.5 MG CR tablet Take 1 tablet by mouth nightly as needed.       ondansetron (ZOFRAN-ODT) 4 MG ODT tab Take 1 tablet (4 mg) by mouth every 6 hours as needed for nausea or vomiting (Patient not taking: Reported on 8/8/2019) 20 tablet 1     oxyCODONE (ROXICODONE) 5 MG tablet Take 1-2 tablets (5-10 mg) by mouth every 3 hours as needed for pain (Patient not taking: Reported on 8/8/2019) 20 tablet 0     senna-docusate (SENOKOT-S/PERICOLACE) 8.6-50 MG tablet Take 1 tablet by mouth 2 times daily (Patient not taking: Reported on 8/8/2019) 60 tablet 1       Allergies   Allergen Reactions     Morphine Hcl Nausea     Vicodin [Hydrocodone-Acetaminophen] Nausea       Review of Systems:  -Constitutional: The patient denies fatigue, fevers, chills, unintended weight loss, and night sweats.  -HEENT: Patient denies nonhealing oral sores.  -Skin: As above in HPI. No additional skin concerns.    Physical exam:  Vitals:  There were no vitals taken for this visit.  GEN: This is a well developed, well-nourished female in no acute distress, in a pleasant mood.    SKIN: Waist-up skin, which includes the head/face, neck, both arms, chest, back, abdomen, digits and/or nails was examined.  -Linear incision that goes 4cm upward from the umbilicus  -Two incision sites on the left lateral abdomen that are approximately 1.5-2 inches in length. Appear well healed with no sign of infection.   -Faint, patchy erythema with some mild scaling on the mid to lower abdomen that stretches to the right and left flank  -Faint, pink, slightly patchy erythema with no scaling on the anterior forearms  -No other lesions of concern on areas examined.       Impression/Plan:  1. Dermatitis, potentially atopic but possibly irritant given recent surgical history and location of rash    Start triamcinolone 0.1% external cream, apply topically QAM, then use triamcinolone 0.1% ointment at bedtime. Discussed risks of long term steroid use.    Recommended Cerave soaps    Recommended a gentle skin care routine and provided dry skin care handout    Discussed gentle skin care techniques    If not improved or resolved in 1-2mo will consider bx    CC Dr. Dexter on close of this encounter.  Follow-up prn for new or changing lesions.       Staff Involved:  Staff/Scribe    Scribe Disclosure:  I, Roberto Gamboa, am serving as a scribe to document services personally performed by Greer Zarco PA-C, based on data collection and the provider's statements to me.     Provider Disclosure:   The documentation recorded by the scribe accurately reflects the services I personally performed and the decisions made by me.    All risks, benefits and alternatives were discussed with patient.  Patient is in agreement and understands the assessment and plan.  All questions were answered.    Greer Zarco PA-C  [facility]  Center: Phone: [phone], Fax: [phone]

## 2019-09-04 NOTE — PROGRESS NOTES
"[facility] Dermatology Note      Dermatology Problem List:  1. Dermatitis  -Current tx: triamcinolone 0.1% cream, alternating with 0.1% ointment    Encounter Date: Sep 4, 2019    CC:  Chief Complaint   Patient presents with     Derm Problem     Melisa is here today to be seen for a rash on her abdomen and forearms- Melisa states \"it's spreading\".          History of Present Illness:  Ms. Jacque Villasenor is a 64 year old female who is new to the clinic and presents for dermatitis. At today's visit, the patient notes that she donated a kidney approximately 6 weeks ago. Approximately 2 weeks ago, the patient developed a rash on her abdomen. She then called the transplant team who advised her to use hydrocortisone cream, take benadryl pills at night, and use Aveeno or Cerave cream during the day. The patient states that the itching subsided completely within a few days on this regimen and the redness started to subside in approximately 1 week. It is nearly resolved on her abdomen. It was inially the worse near and around the incision sites on the abdomen. 4 days ago a similar rash appeared on her bilateral forearms. But today, the rash is not present or minimally present on her forearms. The rash was present yesterday, and she noticed an itch. The patient states she has been following the transplant team's advised plan still. She complains her forearms still itch. All she has used on her forearms is the Cerave Cream. She has not used hydrocortisone. Cream. Has continued to take nightly benadryl. When the rash appears on her forearms, it feels and looks the same as the rash on the stomach. The patient denies a history of eczema or rashes, seasonal allergies and asthma. She does not believe she has made any significant diet, hygiene or lifestyle changes since her transplant. All of her medications have remained unchanged and are the same medications she has lindy on for years. No new additional " medications in the past several months. The patient states that around when the rash first appeared on the stomach, there was a small amount of adhesive left over from the operation on her stomach. The patient denies painful, itching, tingling or bleeding lesions unless otherwise noted.    Past Medical History:   Patient Active Problem List   Diagnosis     CARDIOVASCULAR SCREENING; LDL GOAL LESS THAN 160     Transplant donor evaluation     Encounter for donation of kidney     Past Medical History:   Diagnosis Date     Cataract      Fibromyalgia      Glaucoma      Insomnia      Insomnia      Osteoarthritis      Osteoporosis      Osteoporosis      Past Surgical History:   Procedure Laterality Date     AS TOTAL KNEE ARTHROPLASTY       BUNIONECTOMY       HYSTERECTOMY       LAPAROSCOPIC DONOR HAND ASSISTED KIDNEY LIVING UNRELATED N/A 7/24/2019    Procedure: Laparoscopic Hand Assisted Living Non Directed Kidney Donor;  Surgeon: Tahir Zaldivar MD;  Location: [facility] OR     TUBAL/ECTOPIC PREGNANCY         Social History:   reports that she has never smoked. She has never used smokeless tobacco. She reports that she drinks alcohol. She reports that she does not use drugs.    Family History:  Family History   Problem Relation Age of Onset     Coronary Artery Disease Maternal Grandmother        Medications:  Current Outpatient Medications   Medication Sig Dispense Refill     acetaminophen (ACETAMIN) 500 MG tablet Take 2 tablets every 8 hours x 3 days then may take 1-2 tablets every 6 hours PRN mild/moderate pain. 100 tablet 0     alendronate (FOSAMAX) 70 MG tablet Take 70 mg by mouth every 7 days       amitriptyline (ELAVIL) 25 MG tablet Take 1 tablet (25 mg) by mouth At Bedtime       amoxicillin (AMOXIL) 500 MG capsule Take 4 capsules by mouth 1 hour prior to dental appointment.       aspirin 81 MG EC tablet Take 81 mg by mouth daily       brimonidine-timolol (COMBIGAN) 0.2-0.5 % ophthalmic solution Place 1 drop into both  eyes 2 times daily       Calcium Carbonate-Vitamin D (CALCIUM 600 + D OR) Take 1 tablet by mouth daily.       docusate sodium (COLACE) 100 MG capsule Take 1-2 capsules by mouth daily as needed.       latanoprostene bunod (VYZULTA) 0.024 % SOLN ophthalmic solution Place 1 drop into both eyes At Bedtime       methocarbamol (ROBAXIN) 500 MG tablet Take 1 tablet (500 mg) by mouth 4 times daily as needed for muscle spasms 30 tablet 0     netarsudil dimesylate (RHOPRESSA) 0.02 % SOLN Place 1 drop into both eyes daily        omega-3 fatty acids (FISH OIL) 1200 MG capsule Take 1 capsule by mouth daily.       SUMAtriptan (IMITREX) 100 MG tablet Take 1 tablet by mouth at onset of headache. May repeat once in 24 hours if needed.       zolpidem (AMBIEN CR) 12.5 MG CR tablet Take 1 tablet by mouth nightly as needed.       ondansetron (ZOFRAN-ODT) 4 MG ODT tab Take 1 tablet (4 mg) by mouth every 6 hours as needed for nausea or vomiting (Patient not taking: Reported on 8/8/2019) 20 tablet 1     oxyCODONE (ROXICODONE) 5 MG tablet Take 1-2 tablets (5-10 mg) by mouth every 3 hours as needed for pain (Patient not taking: Reported on 8/8/2019) 20 tablet 0     senna-docusate (SENOKOT-S/PERICOLACE) 8.6-50 MG tablet Take 1 tablet by mouth 2 times daily (Patient not taking: Reported on 8/8/2019) 60 tablet 1       Allergies   Allergen Reactions     Morphine Hcl Nausea     Vicodin [Hydrocodone-Acetaminophen] Nausea       Review of Systems:  -Constitutional: The patient denies fatigue, fevers, chills, unintended weight loss, and night sweats.  -HEENT: Patient denies nonhealing oral sores.  -Skin: As above in HPI. No additional skin concerns.    Physical exam:  Vitals: There were no vitals taken for this visit.  GEN: This is a well developed, well-nourished female in no acute distress, in a pleasant mood.    SKIN: Waist-up skin, which includes the head/face, neck, both arms, chest, back, abdomen, digits and/or nails was examined.  -Linear  incision that goes 4cm upward from the umbilicus  -Two incision sites on the left lateral abdomen that are approximately 1.5-2 inches in length. Appear well healed with no sign of infection.   -Faint, patchy erythema with some mild scaling on the mid to lower abdomen that stretches to the right and left flank  -Faint, pink, slightly patchy erythema with no scaling on the anterior forearms  -No other lesions of concern on areas examined.       Impression/Plan:  1. Dermatitis, potentially atopic but possibly irritant given recent surgical history and location of rash    Start triamcinolone 0.1% external cream, apply topically QAM, then use triamcinolone 0.1% ointment at bedtime. Discussed risks of long term steroid use.    Recommended Cerave soaps    Recommended a gentle skin care routine and provided dry skin care handout    Discussed gentle skin care techniques    If not improved or resolved in 1-2mo will consider bx    CC Dr. Dexter on close of this encounter.  Follow-up prn for new or changing lesions.       Staff Involved:  Staff/Scribe    Scribe Disclosure:  I, Roberto Gamboa, am serving as a scribe to document services personally performed by Greer Zarco PA-C, based on data collection and the provider's statements to me.     Provider Disclosure:   The documentation recorded by the scribe accurately reflects the services I personally performed and the decisions made by me.    All risks, benefits and alternatives were discussed with patient.  Patient is in agreement and understands the assessment and plan.  All questions were answered.    Greer Zarco PA-C  [facility] [place]: Phone: [phone], Fax: [phone]

## 2019-09-04 NOTE — NURSING NOTE
"Dermatology Rooming Note    Jacque Villasenor's goals for this visit include:   Chief Complaint   Patient presents with     Derm Problem     Melisa is here today to be seen for a rash on her abdomen and forearms- Melisa states \"it's spreading\".      Roselia Torres, RMA     "

## 2019-09-19 ENCOUNTER — TELEPHONE (OUTPATIENT)
Dept: TRANSPLANT | Facility: CLINIC | Age: 64
End: 2019-09-19

## 2019-09-19 NOTE — TELEPHONE ENCOUNTER
I called the patient today as there was a message from her that she is having fatigue.  She denies fever and states her surgical wound is healing nicely.  She states she had to take the day off this week to rest.  She feels tired even when waking up after sleeping for 7 hours.  She denies any issues with eating.  We reviewed that she may need to drink more fluids, she will try to increase her amount of water.  She reports normal bowel function and is able to urinate without difficulty.    She is concerned over missing work.  I will compose a letter to generally state that healing from this surgery takes time.  She is interested in sharing identity with her recipient if they are so willing.  I will plan to email her a waiver to share identity.

## 2019-09-28 ENCOUNTER — HEALTH MAINTENANCE LETTER (OUTPATIENT)
Age: 64
End: 2019-09-28

## 2019-09-29 ENCOUNTER — NURSE TRIAGE (OUTPATIENT)
Dept: NURSING | Facility: CLINIC | Age: 64
End: 2019-09-29

## 2019-09-29 NOTE — TELEPHONE ENCOUNTER
Pt had donated a kidney 9 wks ago and for the last couple days has had urgency of urine. Denies dysuria, fever, vomiting, low back, side or flank pain. Wonders if she can go to her clinic for this. Informed could be seen in UC today to start culture sooner, or her clinic within 24 hours. To be seen in  ED today if symptoms progress or new ones develop and they would contact her transplant team as needed.     Piedad Carmona RN, [facility]      Reason for Disposition    Urinating more frequently than usual (i.e., frequency)    Additional Information    Negative: Shock suspected (e.g., cold/pale/clammy skin, too weak to stand, low BP, rapid pulse)    Negative: Sounds like a life-threatening emergency to the triager    Negative: Followed a genital area injury    Negative: Followed a genital area injury (penis, scrotum)    Negative: Vaginal discharge    Negative: Pus (white, yellow) or bloody discharge from end of penis    Negative: [1] Taking antibiotic for urinary tract infection (UTI) AND [2] female    Negative: [1] Taking antibiotic for urinary tract infection (UTI) AND [2] male    Negative: [1] Discomfort (pain, burning or stinging) when passing urine AND [2] pregnant    Negative: [1] Discomfort (pain, burning or stinging) when passing urine AND [2] postpartum < 1 month    Negative: [1] Discomfort (pain, burning or stinging) when passing urine AND [2] female    Negative: [1] Discomfort (pain, burning or stinging) when passing urine AND [2] male    Negative: Pain or itching in the vulvar area    Negative: Pain in scrotum is main symptom    Negative: Blood in the urine is main symptom    Negative: Symptoms arising from use of a urinary catheter (Xiao or Coude)    Negative: [1] Unable to urinate (or only a few drops) > 4 hours AND     [2] bladder feels very full (e.g., palpable bladder or strong urge to urinate)    Negative: [1] Decreased urination and [2] drinking very little AND [2] dehydration suspected  (e.g., dark urine, no urine > 12 hours, very dry mouth, very lightheaded)    Negative: Patient sounds very sick or weak to the triager    Negative: Fever > 100.5 F (38.1 C)    Negative: Side (flank) or lower back pain present    Negative: [1] Can't control passage of urine (i.e., urinary incontinence) AND [2] new onset (< 2 weeks) or worsening    Protocols used: URINARY SYMPTOMS-A-AH

## 2019-10-01 ENCOUNTER — DOCUMENTATION ONLY (OUTPATIENT)
Dept: TRANSPLANT | Facility: CLINIC | Age: 64
End: 2019-10-01

## 2019-10-01 ENCOUNTER — TELEPHONE (OUTPATIENT)
Dept: TRANSPLANT | Facility: CLINIC | Age: 64
End: 2019-10-01

## 2019-10-01 NOTE — PROGRESS NOTES
I have faxed FMLA forms to [employer] due to continued fatigue.  The patient has needed to take occasional days off as she is recovering due to fatigue.  I have received the patients signed waiver for sharing her identity with anonymous recipient, I have sent it to be scanned.  I will follow up with the recipient to see what their wishes are in meeting her.  I have sent the patient an email letting her know that above information.

## 2019-10-01 NOTE — TELEPHONE ENCOUNTER
"The patient is reporting \"Constant feeling that she has to urinate\".  She went to PCP and was negative for UTI, see care everywhere.  She wishes to get the opinion of the transplant team.  I will review with her surgeon or with the NP for Transplant.    She states she has been drinking a large amount of fluids but always feels that she has to go even immediately after going.  "

## 2019-10-02 DIAGNOSIS — Z52.4 KIDNEY DONOR: Primary | ICD-10-CM

## 2019-10-03 ENCOUNTER — OFFICE VISIT (OUTPATIENT)
Dept: TRANSPLANT | Facility: CLINIC | Age: 64
End: 2019-10-03
Attending: NURSE PRACTITIONER

## 2019-10-03 ENCOUNTER — ANCILLARY PROCEDURE (OUTPATIENT)
Dept: GENERAL RADIOLOGY | Facility: CLINIC | Age: 64
End: 2019-10-03
Attending: TRANSPLANT SURGERY
Payer: COMMERCIAL

## 2019-10-03 VITALS
BODY MASS INDEX: 26.19 KG/M2 | WEIGHT: 157.2 LBS | HEIGHT: 65 IN | SYSTOLIC BLOOD PRESSURE: 114 MMHG | HEART RATE: 88 BPM | DIASTOLIC BLOOD PRESSURE: 84 MMHG

## 2019-10-03 DIAGNOSIS — Z52.4 KIDNEY DONOR: ICD-10-CM

## 2019-10-03 DIAGNOSIS — K59.00 CONSTIPATION, UNSPECIFIED CONSTIPATION TYPE: Primary | ICD-10-CM

## 2019-10-03 PROCEDURE — G0463 HOSPITAL OUTPT CLINIC VISIT: HCPCS | Mod: ZF

## 2019-10-03 RX ORDER — AMOXICILLIN 250 MG
2 CAPSULE ORAL 2 TIMES DAILY PRN
Qty: 30 TABLET | Refills: 0 | Status: SHIPPED | OUTPATIENT
Start: 2019-10-03

## 2019-10-03 RX ORDER — POLYETHYLENE GLYCOL 3350 17 G/17G
1 POWDER, FOR SOLUTION ORAL 2 TIMES DAILY
Qty: 1 BOTTLE | Refills: 0 | Status: SHIPPED | OUTPATIENT
Start: 2019-10-03

## 2019-10-03 ASSESSMENT — MIFFLIN-ST. JEOR: SCORE: 1263.93

## 2019-10-03 ASSESSMENT — PAIN SCALES - GENERAL: PAINLEVEL: NO PAIN (0)

## 2019-10-03 NOTE — LETTER
10/3/2019       RE: Jacque Villasenor  [id number]  [facility] [id number]     Dear Colleague,    Thank you for referring your patient, Jacque Villasenor, to the [facility] SOLID ORGAN TRANSPLANT at [facility]. Please see a copy of my visit note below.    Transplant Surgery Kidney Donor Progress Note    Medical record number: [id number]  YOB: 1955,   Date of Visit:  10/03/2019  For followup after kidney donation.    Assessment and Recommendations: Ms. Villasenor is doing fairly well after kidney donation.     1. Lifting restrictions: 10 lbs until 6 weeks post donation.  2. Followup: commitment to healthy lifestyle and routine health exams as recommended for age and gender.   6 mo, 1 yr, and 2 yr followup per routine.   3. Return to work to be determined per patient's progress, between 6-8 weeks after surgery.  4. Constipation:  Xray shows stool burden.  Miralax and senna ordered. Likely the cause of frequent urination.  UA negative.  Monitor symptoms after daily bowel movements.      Total time: 15 minutes  Counselling time: 10 minutes        ---------------------------------------------------------------------------------------------------    S: Ms. Villasenor donate a kidney 72days  ago and is doing well, reporting no fevers, chills, dysuria.  She is not taking narcotic analgesia.  She is back to partaking in routine activities of daily living. Post-donation concerns are: frequent urination.  .    Medications:  Prescription Medications as of 10/4/2019       Rx Number Disp Refills Start End Last Dispensed Date Next Fill Date Owning Pharmacy    acetaminophen (ACETAMIN) 500 MG tablet  100 tablet 0 7/26/2019    [facility] - [place], [place] - [address] [facility]    Sig: Take 2 tablets every 8 hours x 3 days then may take 1-2 tablets every 6 hours PRN mild/moderate pain.    Class: E-Prescribe    alendronate (FOSAMAX) 70 MG tablet            Sig:  Take 70 mg by mouth every 7 days    Class: Historical    Route: Oral    amitriptyline (ELAVIL) 25 MG tablet    7/26/2019    [address]    Sig: Take 1 tablet (25 mg) by mouth At Bedtime    Class: No Print Out    Route: Oral    aspirin 81 MG EC tablet            Sig: Take 81 mg by mouth daily    Class: Historical    Route: Oral    brimonidine-timolol (COMBIGAN) 0.2-0.5 % ophthalmic solution            Sig: Place 1 drop into both eyes 2 times daily    Class: Historical    Route: Both Eyes    Calcium Carbonate-Vitamin D (CALCIUM 600 + D OR)            Sig: Take 1 tablet by mouth daily.    Class: Historical    Route: Oral    docusate sodium (COLACE) 100 MG capsule            Sig: Take 1-2 capsules by mouth daily as needed.    Class: Historical    Route: Oral    latanoprostene bunod (VYZULTA) 0.024 % SOLN ophthalmic solution    7/26/2019    [address]    Sig: Place 1 drop into both eyes At Bedtime    Class: No Print Out    Route: Both Eyes    methocarbamol (ROBAXIN) 500 MG tablet  30 tablet 0 7/26/2019    [address]    Sig: Take 1 tablet (500 mg) by mouth 4 times daily as needed for muscle spasms    Class: E-Prescribe    Route: Oral    netarsudil dimesylate (RHOPRESSA) 0.02 % SOLN            Sig: Place 1 drop into both eyes daily     Class: Historical    Route: Both Eyes    omega-3 fatty acids (FISH OIL) 1200 MG capsule            Sig: Take 1 capsule by mouth daily.    Class: Historical    Route: Oral    polyethylene glycol (MIRALAX/GLYCOLAX) powder  1 Bottle 0 10/3/2019    [facility] [address]    Sig: Take 17 g (1 capful) by mouth 2 times daily    Class: E-Prescribe    Notes to Pharmacy: Kidney donor    Route: Oral    senna-docusate (SENOKOT-S/PERICOLACE) 8.6-50 MG tablet  30 tablet  0 10/3/2019    [address]    Sig: Take 2 tablets by mouth 2 times daily as needed for constipation    Class: E-Prescribe    Notes to Pharmacy: Kidney donor    Route: Oral    SUMAtriptan (IMITREX) 100 MG tablet            Sig: Take 1 tablet by mouth at onset of headache. May repeat once in 24 hours if needed.    Class: Historical    Route: Oral    triamcinolone (KENALOG) 0.1 % external cream  80 g 3 9/4/2019    [id number] - [place], [place] - [address] [facility]    Sig: Apply topically 2 times daily    Class: E-Prescribe    Route: Topical    triamcinolone (KENALOG) 0.1 % external ointment  80 g 3 9/4/2019    [id number]  [place], [place] - [address] [facility]    Sig: Apply topically 2 times daily    Class: E-Prescribe    Route: Topical    zolpidem (AMBIEN CR) 12.5 MG CR tablet            Sig: Take 1 tablet by mouth nightly as needed.    Class: Historical    Route: Oral          Exam:      Appearance: in no apparent distress.   Skin: normal  Head and Neck: Normal, no rashes or jaundice  Respiratory: normal respiratory excursions, no audible wheeze  Cardiovascular: RRR  Abdomen: rounded, hypoactive bowel sounds     Extremeties: Edema, none  Neuro: without deficit       Labs:   Recent Labs   Lab Test 07/25/19  0553  02/08/19  0720   BUN 13  --  16   CR 1.04   < > 0.86   GFRESTIMATED 57*   < > 71   GLC  --   --  94    < > = values in this interval not displayed.     Recent Labs   Lab Test 07/16/19  1106   COLOR Yellow   APPEARANCE Clear   URINEGLC Negative   URINEBILI Negative   URINEKETONE Negative   SG 1.019   UBLD Small*   URINEPH 5.0   PROTEIN Negative   NITRITE Negative   LEUKEST Negative   RBCU 1   WBCU <1     Recent Labs   Lab Test 02/08/19  0816   [facility] 0.07       Again, thank you for allowing me to participate in the care of your patient.      Sincerely,    Elle Brasher NP

## 2019-10-03 NOTE — NURSING NOTE
"Chief Complaint   Patient presents with     Surgical Followup     voiding alot     Blood pressure 114/84, pulse 88, height 1.651 m (5' 5\"), weight 71.3 kg (157 lb 3.2 oz).    Patel Stevenson CMA on 10/3/2019 at 11:24 AM    "

## 2019-10-03 NOTE — LETTER
10/3/2019      RE: Jacque Villasenor  [id number] [place] [facility] [address]       Transplant Surgery Kidney Donor Progress Note    Medical record number: [id number]  YOB: 1955,   Date of Visit:  10/03/2019  For followup after kidney donation.    Assessment and Recommendations: Ms. Villasenor is doing fairly well after kidney donation.     1. Lifting restrictions: 10 lbs until 6 weeks post donation.  2. Followup: commitment to healthy lifestyle and routine health exams as recommended for age and gender.   6 mo, 1 yr, and 2 yr followup per routine.   3. Return to work to be determined per patient's progress, between 6-8 weeks after surgery.  4. Constipation:  Xray shows stool burden.  Miralax and senna ordered. Likely the cause of frequent urination.  UA negative.  Monitor symptoms after daily bowel movements.      Total time: 15 minutes  Counselling time: 10 minutes        ---------------------------------------------------------------------------------------------------    S: Ms. Villasenor donate a kidney 72days  ago and is doing well, reporting no fevers, chills, dysuria.  She is not taking narcotic analgesia.  She is back to partaking in routine activities of daily living. Post-donation concerns are: frequent urination.  .    Medications:  Prescription Medications as of 10/4/2019       Rx Number Disp Refills Start End Last Dispensed Date Next Fill Date Owning Pharmacy    acetaminophen (ACETAMIN) 500 MG tablet  100 tablet 0 7/26/2019    [facility] - [address]    Sig: Take 2 tablets every 8 hours x 3 days then may take 1-2 tablets every 6 hours PRN mild/moderate pain.    Class: E-Prescribe    alendronate (FOSAMAX) 70 MG tablet            Sig: Take 70 mg by mouth every 7 days    Class: Historical    Route: Oral    amitriptyline (ELAVIL) 25 MG tablet    7/26/2019    [facility] - [address]    Sig: Take 1  tablet (25 mg) by mouth At Bedtime    Class: No Print Out    Route: Oral    aspirin 81 MG EC tablet            Sig: Take 81 mg by mouth daily    Class: Historical    Route: Oral    brimonidine-timolol (COMBIGAN) 0.2-0.5 % ophthalmic solution            Sig: Place 1 drop into both eyes 2 times daily    Class: Historical    Route: Both Eyes    Calcium Carbonate-Vitamin D (CALCIUM 600 + D OR)            Sig: Take 1 tablet by mouth daily.    Class: Historical    Route: Oral    docusate sodium (COLACE) 100 MG capsule            Sig: Take 1-2 capsules by mouth daily as needed.    Class: Historical    Route: Oral    latanoprostene bunod (VYZULTA) 0.024 % SOLN ophthalmic solution    7/26/2019    [address]    Sig: Place 1 drop into both eyes At Bedtime    Class: No Print Out    Route: Both Eyes    methocarbamol (ROBAXIN) 500 MG tablet  30 tablet 0 7/26/2019    [address]    Sig: Take 1 tablet (500 mg) by mouth 4 times daily as needed for muscle spasms    Class: E-Prescribe    Route: Oral    netarsudil dimesylate (RHOPRESSA) 0.02 % SOLN            Sig: Place 1 drop into both eyes daily     Class: Historical    Route: Both Eyes    omega-3 fatty acids (FISH OIL) 1200 MG capsule            Sig: Take 1 capsule by mouth daily.    Class: Historical    Route: Oral    polyethylene glycol (MIRALAX/GLYCOLAX) powder  1 Bottle 0 10/3/2019    [address]    Sig: Take 17 g (1 capful) by mouth 2 times daily    Class: E-Prescribe    Notes to Pharmacy: Kidney donor    Route: Oral    senna-docusate (SENOKOT-S/PERICOLACE) 8.6-50 MG tablet  30 tablet 0 10/3/2019    [phone]    Sig: Take 2 tablets by mouth 2 times daily as needed for constipation    Class: E-Prescribe    Notes to  Pharmacy: Kidney donor    Route: Oral    SUMAtriptan (IMITREX) 100 MG tablet            Sig: Take 1 tablet by mouth at onset of headache. May repeat once in 24 hours if needed.    Class: Historical    Route: Oral    triamcinolone (KENALOG) 0.1 % external cream  80 g 3 9/4/2019    [id number] - [place], [place] - [address] [facility]    Sig: Apply topically 2 times daily    Class: E-Prescribe    Route: Topical    triamcinolone (KENALOG) 0.1 % external ointment  80 g 3 9/4/2019    [id number] - [place], [place] - [address] [facility]    Sig: Apply topically 2 times daily    Class: E-Prescribe    Route: Topical    zolpidem (AMBIEN CR) 12.5 MG CR tablet            Sig: Take 1 tablet by mouth nightly as needed.    Class: Historical    Route: Oral          Exam:      Appearance: in no apparent distress.   Skin: normal  Head and Neck: Normal, no rashes or jaundice  Respiratory: normal respiratory excursions, no audible wheeze  Cardiovascular: RRR  Abdomen: rounded, hypoactive bowel sounds     Extremeties: Edema, none  Neuro: without deficit       Labs:   Recent Labs   Lab Test 07/25/19  0553  02/08/19  0720   BUN 13  --  16   CR 1.04   < > 0.86   GFRESTIMATED 57*   < > 71   GLC  --   --  94    < > = values in this interval not displayed.     Recent Labs   Lab Test 07/16/19  1106   COLOR Yellow   APPEARANCE Clear   URINEGLC Negative   URINEBILI Negative   URINEKETONE Negative   SG 1.019   UBLD Small*   URINEPH 5.0   PROTEIN Negative   NITRITE Negative   LEUKEST Negative   RBCU 1   WBCU <1     Recent Labs   Lab Test 02/08/19  0816   [facility] 0.07       Elle Brasher NP

## 2019-10-04 ENCOUNTER — TELEPHONE (OUTPATIENT)
Dept: TRANSPLANT | Facility: CLINIC | Age: 64
End: 2019-10-04

## 2019-10-04 NOTE — TELEPHONE ENCOUNTER
I called the patient and she report some relief from having bowel movement.  She feels like there is more to come.  She will let us know on Monday if her symptoms of discomfort continue.  For now she will use miralax and senna as prescribed.

## 2019-10-04 NOTE — PROGRESS NOTES
Transplant Surgery Kidney Donor Progress Note    Medical record number: [id number]  YOB: 1955,   Date of Visit:  10/03/2019  For followup after kidney donation.    Assessment and Recommendations: Ms. Villasenor is doing fairly well after kidney donation.     1. Lifting restrictions: 10 lbs until 6 weeks post donation.  2. Followup: commitment to healthy lifestyle and routine health exams as recommended for age and gender.   6 mo, 1 yr, and 2 yr followup per routine.   3. Return to work to be determined per patient's progress, between 6-8 weeks after surgery.  4. Constipation:  Xray shows stool burden.  Miralax and senna ordered. Likely the cause of frequent urination.  UA negative.  Monitor symptoms after daily bowel movements.      Total time: 15 minutes  Counselling time: 10 minutes        ---------------------------------------------------------------------------------------------------    S: Ms. Villasenor donate a kidney 72days  ago and is doing well, reporting no fevers, chills, dysuria.  She is not taking narcotic analgesia.  She is back to partaking in routine activities of daily living. Post-donation concerns are: frequent urination.  .    Medications:  Prescription Medications as of 10/4/2019       Rx Number Disp Refills Start End Last Dispensed Date Next Fill Date Owning Pharmacy    acetaminophen (ACETAMIN) 500 MG tablet  100 tablet 0 7/26/2019    [address]    Sig: Take 2 tablets every 8 hours x 3 days then may take 1-2 tablets every 6 hours PRN mild/moderate pain.    Class: E-Prescribe    alendronate (FOSAMAX) 70 MG tablet            Sig: Take 70 mg by mouth every 7 days    Class: Historical    Route: Oral    amitriptyline (ELAVIL) 25 MG tablet    7/26/2019    [address]    Sig: Take 1 tablet (25 mg) by mouth At Bedtime    Class: No Print Out    Route: Oral    aspirin 81 MG  EC tablet            Sig: Take 81 mg by mouth daily    Class: Historical    Route: Oral    brimonidine-timolol (COMBIGAN) 0.2-0.5 % ophthalmic solution            Sig: Place 1 drop into both eyes 2 times daily    Class: Historical    Route: Both Eyes    Calcium Carbonate-Vitamin D (CALCIUM 600 + D OR)            Sig: Take 1 tablet by mouth daily.    Class: Historical    Route: Oral    docusate sodium (COLACE) 100 MG capsule            Sig: Take 1-2 capsules by mouth daily as needed.    Class: Historical    Route: Oral    latanoprostene bunod (VYZULTA) 0.024 % SOLN ophthalmic solution    7/26/2019    [address]    Sig: Place 1 drop into both eyes At Bedtime    Class: No Print Out    Route: Both Eyes    methocarbamol (ROBAXIN) 500 MG tablet  30 tablet 0 7/26/2019    [address]    Sig: Take 1 tablet (500 mg) by mouth 4 times daily as needed for muscle spasms    Class: E-Prescribe    Route: Oral    netarsudil dimesylate (RHOPRESSA) 0.02 % SOLN            Sig: Place 1 drop into both eyes daily     Class: Historical    Route: Both Eyes    omega-3 fatty acids (FISH OIL) 1200 MG capsule            Sig: Take 1 capsule by mouth daily.    Class: Historical    Route: Oral    polyethylene glycol (MIRALAX/GLYCOLAX) powder  1 Bottle 0 10/3/2019    [phone]    Sig: Take 17 g (1 capful) by mouth 2 times daily    Class: E-Prescribe    Notes to Pharmacy: Kidney donor    Route: Oral    senna-docusate (SENOKOT-S/PERICOLACE) 8.6-50 MG tablet  30 tablet 0 10/3/2019    [phone]    Sig: Take 2 tablets by mouth 2 times daily as needed for constipation    Class: E-Prescribe    Notes to Pharmacy: Kidney donor    Route: Oral    SUMAtriptan (IMITREX) 100 MG tablet            Sig:  Take 1 tablet by mouth at onset of headache. May repeat once in 24 hours if needed.    Class: Historical    Route: Oral    triamcinolone (KENALOG) 0.1 % external cream  80 g 3 9/4/2019    [id number]  [place], [place] - [address] [facility]    Sig: Apply topically 2 times daily    Class: E-Prescribe    Route: Topical    triamcinolone (KENALOG) 0.1 % external ointment  80 g 3 9/4/2019    [id number] - [place], [place] - [address] [facility]    Sig: Apply topically 2 times daily    Class: E-Prescribe    Route: Topical    zolpidem (AMBIEN CR) 12.5 MG CR tablet            Sig: Take 1 tablet by mouth nightly as needed.    Class: Historical    Route: Oral          Exam:      Appearance: in no apparent distress.   Skin: normal  Head and Neck: Normal, no rashes or jaundice  Respiratory: normal respiratory excursions, no audible wheeze  Cardiovascular: RRR  Abdomen: rounded, hypoactive bowel sounds     Extremeties: Edema, none  Neuro: without deficit       Labs:   Recent Labs   Lab Test 07/25/19  0553  02/08/19  0720   BUN 13  --  16   CR 1.04   < > 0.86   GFRESTIMATED 57*   < > 71   GLC  --   --  94    < > = values in this interval not displayed.     Recent Labs   Lab Test 07/16/19  1106   COLOR Yellow   APPEARANCE Clear   URINEGLC Negative   URINEBILI Negative   URINEKETONE Negative   SG 1.019   UBLD Small*   URINEPH 5.0   PROTEIN Negative   NITRITE Negative   LEUKEST Negative   RBCU 1   WBCU <1     Recent Labs   Lab Test 02/08/19  0816   [facility] 0.07

## 2020-02-13 ENCOUNTER — TELEPHONE (OUTPATIENT)
Dept: TRANSPLANT | Facility: CLINIC | Age: 65
End: 2020-02-13

## 2020-02-13 NOTE — TELEPHONE ENCOUNTER
I have replied back that I will call the recipient and provide her phone number per her request.  Waivers to share identity on file for both patients.      From: Jacque Villasenor <[email or web address]   Sent: Thursday, February 13, 2020 9:55 AM  To: Ivone Higuera <[email or web address]>  Subject: RE: [EXTERNAL] RE: Send data from [id number] 09/23/2019 10:38    Jaison Wiseman been so busy forgot all about this. You can certainly give my phone number to my recipient and/or email address. Whatever works best for her. Can I ask what her name is?  Just so if I see it on the phone I will answer it. (don t always answer numbers I don't recognize). It will be great to have contact with her.    Thanks Ivone.    Jacque Villasenor   II  [facility]

## 2020-03-15 ENCOUNTER — HEALTH MAINTENANCE LETTER (OUTPATIENT)
Age: 65
End: 2020-03-15

## 2021-01-10 ENCOUNTER — HEALTH MAINTENANCE LETTER (OUTPATIENT)
Age: 66
End: 2021-01-10

## 2021-03-13 ENCOUNTER — HEALTH MAINTENANCE LETTER (OUTPATIENT)
Age: 66
End: 2021-03-13

## 2021-05-08 ENCOUNTER — HEALTH MAINTENANCE LETTER (OUTPATIENT)
Age: 66
End: 2021-05-08

## 2021-08-06 ENCOUNTER — TELEPHONE (OUTPATIENT)
Dept: TRANSPLANT | Facility: CLINIC | Age: 66
End: 2021-08-06

## 2021-08-06 NOTE — TELEPHONE ENCOUNTER
I spoke with the patient regarding her 2 year labs.  I reviewed the results of the labs done were stable.  I reviewed that reference range values are for someone that has two kidneys.  I gave the patient instruction to have a good relationship with PCP and have labs done for good health maintance. I reminded not to use ibuprofen or other NSAID drugs.     I told the patient that follow up care is now done but our Research group will be sending the long term Donor Follow up study packets.  I wished the patient well and encouraged the patient to contact us if there should ever be an issue that pertains to the kidney donation.  I have marked the Transplant Episode Status as Not Followed and Followup Completed.  Tasks:  1.  The patient is interested in sharing identity and both she and the recipient have provided a waiver.  I will reach out to recipient and provide her with the patient's phone number.  2.  The patient has requested that our team reach out to her PCP as Dr Martinez was very worried about her kidney function.  I will contact the PCP and provided education on post kidney donor management and if needed facilitate communication with our Living donor Medical Director.  Ivone Higuera RN  Living Donor Coordinator  08/06/2021 3:41 PM

## 2021-10-23 ENCOUNTER — HEALTH MAINTENANCE LETTER (OUTPATIENT)
Age: 66
End: 2021-10-23

## 2021-11-12 DIAGNOSIS — Z98.890 POSTOPERATIVE NAUSEA: Primary | ICD-10-CM

## 2021-11-12 DIAGNOSIS — R11.0 POSTOPERATIVE NAUSEA: Primary | ICD-10-CM

## 2021-11-12 PROCEDURE — 99207 PR NO CHARGE LOS: CPT | Performed by: STUDENT IN AN ORGANIZED HEALTH CARE EDUCATION/TRAINING PROGRAM

## 2021-11-12 RX ORDER — ONDANSETRON 4 MG/1
4 TABLET, ORALLY DISINTEGRATING ORAL EVERY 6 HOURS PRN
Qty: 4 TABLET | Refills: 0 | Status: SHIPPED | OUTPATIENT
Start: 2021-11-12

## 2021-11-12 RX ORDER — ACETAZOLAMIDE 500 MG/1
500 CAPSULE, EXTENDED RELEASE ORAL 2 TIMES DAILY
Qty: 2 CAPSULE | Refills: 0 | Status: SHIPPED | OUTPATIENT
Start: 2021-11-12

## 2021-11-13 NOTE — PROGRESS NOTES
Patient calling with post-operative nausea and vomiting due to sedation received for cataract surgery with a glaucoma procedure this morning with [facility]. No eye pain or vision issues. Allergy list mentions nausea with morphine and Vicodin. Prescribed Zofran ODT and Diamox ER to [facility] in [place] and she has follow up with [facility] tomorrow.     Aris Orosco MD  [phone]

## 2022-02-12 ENCOUNTER — HEALTH MAINTENANCE LETTER (OUTPATIENT)
Age: 67
End: 2022-02-12

## 2022-06-04 ENCOUNTER — HEALTH MAINTENANCE LETTER (OUTPATIENT)
Age: 67
End: 2022-06-04

## 2022-08-20 NOTE — PROGRESS NOTES
Pt notified in other Mychart message to follow up with dermatology. RE: Jacque Villasenor  [facility] #[id number]           I saw your patient, Jacque Villasenor, in consultation in our pretransplant clinic. She was here at clinic for evaluation as a possible kidney donor to a friend.  She had seen our donor video.  Our donor coordinator shared our center-specific results with her.  We discussed:    (1) The risks of donation--including mortality (0.03% risk) and morbidity (approximately 1% risk of major morbidity).  We discussed the major reported causes of death after kidney donation (bleeding, infection, pulmonary embolism).  We discussed the potential complications, including:  bleeding requiring reoperation, infection requiring reoperation, pneumonia, bowel obstruction, infection at the site of the incision, hernia at the site of the incision, deep vein thrombosis, deep vein thrombosis with associated pulmonary embolism, and urinary tract infection.  I told her I could not possibly name all of the risks, but that she was at risk for any complications that could occur in any operation (and that a local library would have books/resources that could provide more detail).       (2) We also discussed the long-term risks of living with one kidney.  We talked in detail about the new publications suggesting slightly increased long-term risk of ESRD after donor nephrectomy.  We discussed the fact that most of the ESRD that has developed after donation has occurred in those donating to a relative; and that it is known that individuals who have a relative with ESRD are at increased risk of ESRD.     (3) The hospital stay and the fact that if all goes well, discharge would occur within two to three days after donor nephrectomy.  We also discussed the fact that there would be no diet or fluid restrictions (again if all went well), and that the only new medication that she would be on would be pain medication.  We discussed the fact that most patients are on Tylenol or something similar within five  to six days of surgery.      (4) The recovery time after surgery and the restrictions that are necessary:  a) no driving for a couple of weeks (or until she felt that her reaction would be adequate if she had to slam on the brakes; and b) no lifting over 10 pounds or any exercise that would stretch her abdominal muscles for six weeks (to allow the muscles to heal so that she doesn't develop a hernia).  We also discussed return to work, which could occur in approximately three to four weeks if she had a desk job or would require not returning to work for at least six weeks if the job required any heavy lifting or exercise.  We discussed the potential for not getting her pep and energy back for anywhere from two to six weeks after surgery and how there was a tremendous individual variation in return of full energy level.  I discussed our donor follow-up studies; and that in our surveys, 90% of donors had their energy back by 6 weeks postdonation.    (5) The concern about long distance travel for the first couple of weeks post-donation.  We discussed the risks of deep vein thrombosis associated with plane or car travel.  I recommended that, if flying, she should get up and walk around every 30-40 minutes; if driving she should ask the  to stop the car every 30-45 minutes so Ms. Villasenor could take a short walk.    (6) The fact that the recipient could be on a waiting list for  donor transplant and would ultimately receive a kidney if Ms. Villasenor did not donate.      I attempted to answer any remaining questions.  I also told her that should she have any questions, she should feel free to contact us.  We would be glad to answer any questions either over the phone or at another clinic visit.  Her transplant coordinator is Ivone Higuera and may be reached at [phone].  Thank you for the opportunity to see her.    I spent 30 minutes with this patient.  Over 95% that time was spent in counseling and  coordination of care.             Yours truly,               Sudarshan Valladares MD         Professor of Surgery         ([phone])      [place]/st

## 2022-09-15 ENCOUNTER — TELEPHONE (OUTPATIENT)
Dept: TRANSPLANT | Facility: CLINIC | Age: 67
End: 2022-09-15

## 2022-09-15 NOTE — TELEPHONE ENCOUNTER
Patient Call: General  Route to LPN    Reason for call: patient is worried about recent creatinine level and would like to know if they should be seen    Call back needed? Yes    Return Call Needed  Same as documented in contacts section  When to return call?: Same day: Route High Priority

## 2022-09-16 NOTE — TELEPHONE ENCOUNTER
The patient has question about her Creatinine level 1.23.  I reviewed it is in alignment with her new normal after her donation.  It is a little higher.  I stated she could try hydrating more.  Patient thought she had been doing okay with drinking water.  She states her health is good, she has not been started on any new medication.  She stated understanding to avoid NSAIDs.  She will tell her PCP that her values are okay and that next time when she has labs she could also do urine protein study and UA for further information on how her kidney is doing.  I encouraged her to reach out if any questions.  Patient stated understanding and is in agreement to the POC.  Ivone Higuera RN  Living [facility] Coordinator  09/16/2022 4:09 PM

## 2022-10-09 ENCOUNTER — HEALTH MAINTENANCE LETTER (OUTPATIENT)
Age: 67
End: 2022-10-09

## 2023-02-18 ENCOUNTER — HEALTH MAINTENANCE LETTER (OUTPATIENT)
Age: 68
End: 2023-02-18

## 2023-10-28 ENCOUNTER — HEALTH MAINTENANCE LETTER (OUTPATIENT)
Age: 68
End: 2023-10-28

## 2024-03-16 ENCOUNTER — HEALTH MAINTENANCE LETTER (OUTPATIENT)
Age: 69
End: 2024-03-16

## (undated) DEVICE — SOL NACL 0.9% INJ 1000ML BAG 2B1324X

## (undated) DEVICE — SU SILK 3-0 TIE 12X30" A304H

## (undated) DEVICE — Device

## (undated) DEVICE — SU VICRYL 0 TIE 54" J608H

## (undated) DEVICE — ESU HARMONIC LAPAROSCOPIC SHEARS HD 1000I 36CM HARHD36

## (undated) DEVICE — BASIN SET SINGLE STERILE 13752-624

## (undated) DEVICE — SUCTION IRR STRYKERFLOW II W/TIP 250-070-520

## (undated) DEVICE — SPONGE RAY-TEC 4X8" 7318

## (undated) DEVICE — CATH TRAY FOLEY SURESTEP 16FR W/URNE MTR STLK LATEX A303316A

## (undated) DEVICE — SU VICRYL 0 UR-6 27" J603H

## (undated) DEVICE — SU SILK 2-0 TIE 12X30" A305H

## (undated) DEVICE — ENDO CLOSING KIT ENDOCLOSE 173022

## (undated) DEVICE — SU VICRYL 0 CT-1 27" UND J260H

## (undated) DEVICE — PREP CHLORAPREP 26ML TINTED ORANGE  260815

## (undated) DEVICE — TUBING INSUFFLATION W/FILTER CPC TO LUER 620-030-301

## (undated) DEVICE — CANNULA VESSEL DLP  30001

## (undated) DEVICE — LINEN TOWEL PACK X5 5464

## (undated) DEVICE — SU PDS II 0 TP-1 60" Z991G

## (undated) DEVICE — SU VICRYL 3-0 SH 27" J316H

## (undated) DEVICE — SURGICEL ABSORBABLE HEMOSTAT SNOW 4"X4" 2083

## (undated) DEVICE — SUCTION MANIFOLD DORNOCH ULTRA CART UL-CL500

## (undated) DEVICE — STPL POWERED ECHELON VASC 35MM PVE35A

## (undated) DEVICE — CLIP APPLIER ENDO ROTATING 10MM MED/LG ER320

## (undated) DEVICE — DRAPE ISOLATION BAG 1003

## (undated) DEVICE — ENDO TROCAR SLEEVE KII ADV FIXATION 05X100MM CFS02

## (undated) DEVICE — SU DERMABOND ADVANCED .7ML DNX12

## (undated) DEVICE — WIPES FOLEY CARE SURESTEP PROVON DFC100

## (undated) DEVICE — DRAPE SLUSH/WARMER 66X44" ORS-320

## (undated) DEVICE — STPL ENDO ARTICULATING ENDOPATH X8 45MM G/B/W ATS45NK

## (undated) DEVICE — ANTIFOG SOLUTION W/FOAM PAD 31142527

## (undated) DEVICE — TUBING IRRIG CYSTO/BLADDER SET 81" LF 2C4040

## (undated) DEVICE — SU SILK 4-0 TIE 12X30" A303H

## (undated) DEVICE — ENDO SCOPE WARMER SEAL  C3101

## (undated) DEVICE — ENDO TROCAR SLEEVE KII ADV FIXATION 12X100MM CFS22

## (undated) DEVICE — ESU ENDO SCISSORS 5MM CVD 5DCS

## (undated) DEVICE — STPL POWERED ECHELON 45MM PSEE45A

## (undated) DEVICE — LINEN TOWEL PACK X6 WHITE 5487

## (undated) DEVICE — SU MONOCRYL 4-0 PS-2 27" UND Y426H

## (undated) DEVICE — LINEN GOWN XLG 5407

## (undated) DEVICE — SOL NACL 0.9% IRRIG 1000ML BOTTLE 2F7124

## (undated) DEVICE — SOL WATER IRRIG 1000ML BOTTLE 2F7114

## (undated) RX ORDER — FUROSEMIDE 10 MG/ML
INJECTION INTRAMUSCULAR; INTRAVENOUS
Status: DISPENSED
Start: 2019-07-24

## (undated) RX ORDER — FENTANYL CITRATE 50 UG/ML
INJECTION, SOLUTION INTRAMUSCULAR; INTRAVENOUS
Status: DISPENSED
Start: 2019-07-24

## (undated) RX ORDER — HYDROMORPHONE HYDROCHLORIDE 1 MG/ML
INJECTION, SOLUTION INTRAMUSCULAR; INTRAVENOUS; SUBCUTANEOUS
Status: DISPENSED
Start: 2019-07-24

## (undated) RX ORDER — GABAPENTIN 300 MG/1
CAPSULE ORAL
Status: DISPENSED
Start: 2019-07-24

## (undated) RX ORDER — KETOROLAC TROMETHAMINE 30 MG/ML
INJECTION, SOLUTION INTRAMUSCULAR; INTRAVENOUS
Status: DISPENSED
Start: 2019-07-24

## (undated) RX ORDER — DEXTROSE, SODIUM CHLORIDE, SODIUM LACTATE, POTASSIUM CHLORIDE, AND CALCIUM CHLORIDE 5; .6; .31; .03; .02 G/100ML; G/100ML; G/100ML; G/100ML; G/100ML
INJECTION, SOLUTION INTRAVENOUS
Status: DISPENSED
Start: 2019-07-24

## (undated) RX ORDER — ONDANSETRON 2 MG/ML
INJECTION INTRAMUSCULAR; INTRAVENOUS
Status: DISPENSED
Start: 2019-07-24

## (undated) RX ORDER — CEFUROXIME SODIUM 1.5 G/16ML
INJECTION, POWDER, FOR SOLUTION INTRAVENOUS
Status: DISPENSED
Start: 2019-07-24

## (undated) RX ORDER — ACETAMINOPHEN 325 MG/1
TABLET ORAL
Status: DISPENSED
Start: 2019-07-24

## (undated) RX ORDER — PAPAVERINE HYDROCHLORIDE 30 MG/ML
INJECTION INTRAMUSCULAR; INTRAVENOUS
Status: DISPENSED
Start: 2019-07-24

## (undated) RX ORDER — CARDIOPLEG/ORGAN PRESERV NO.1 9-198-2-1
BOTTLE PERFUSION
Status: DISPENSED
Start: 2019-07-24